# Patient Record
Sex: FEMALE | Race: WHITE | NOT HISPANIC OR LATINO | URBAN - METROPOLITAN AREA
[De-identification: names, ages, dates, MRNs, and addresses within clinical notes are randomized per-mention and may not be internally consistent; named-entity substitution may affect disease eponyms.]

---

## 2014-02-15 RX ORDER — PANTOPRAZOLE SODIUM 20 MG/1
1 TABLET, DELAYED RELEASE ORAL
Qty: 0 | Refills: 0 | DISCHARGE
Start: 2014-02-15

## 2017-07-23 ENCOUNTER — EMERGENCY (EMERGENCY)
Facility: HOSPITAL | Age: 72
LOS: 1 days | Discharge: HOME | End: 2017-07-23
Attending: EMERGENCY MEDICINE | Admitting: EMERGENCY MEDICINE
Payer: MEDICARE

## 2017-07-23 VITALS
DIASTOLIC BLOOD PRESSURE: 75 MMHG | HEART RATE: 87 BPM | OXYGEN SATURATION: 94 % | TEMPERATURE: 98 F | RESPIRATION RATE: 16 BRPM | SYSTOLIC BLOOD PRESSURE: 131 MMHG

## 2017-07-23 VITALS
TEMPERATURE: 98 F | DIASTOLIC BLOOD PRESSURE: 94 MMHG | HEART RATE: 83 BPM | OXYGEN SATURATION: 100 % | SYSTOLIC BLOOD PRESSURE: 153 MMHG | RESPIRATION RATE: 16 BRPM

## 2017-07-23 DIAGNOSIS — Z98.89 OTHER SPECIFIED POSTPROCEDURAL STATES: Chronic | ICD-10-CM

## 2017-07-23 DIAGNOSIS — Z96.652 PRESENCE OF LEFT ARTIFICIAL KNEE JOINT: Chronic | ICD-10-CM

## 2017-07-23 DIAGNOSIS — M25.519 PAIN IN UNSPECIFIED SHOULDER: Chronic | ICD-10-CM

## 2017-07-23 PROCEDURE — 73030 X-RAY EXAM OF SHOULDER: CPT | Mod: 26,RT

## 2017-07-23 PROCEDURE — 76377 3D RENDER W/INTRP POSTPROCES: CPT | Mod: 26

## 2017-07-23 PROCEDURE — 99284 EMERGENCY DEPT VISIT MOD MDM: CPT | Mod: 25

## 2017-07-23 PROCEDURE — 73200 CT UPPER EXTREMITY W/O DYE: CPT | Mod: 26,RT

## 2017-07-23 PROCEDURE — 76377 3D RENDER W/INTRP POSTPROCES: CPT

## 2017-07-23 PROCEDURE — 73060 X-RAY EXAM OF HUMERUS: CPT

## 2017-07-23 PROCEDURE — 73200 CT UPPER EXTREMITY W/O DYE: CPT

## 2017-07-23 PROCEDURE — 73030 X-RAY EXAM OF SHOULDER: CPT

## 2017-07-23 PROCEDURE — 73060 X-RAY EXAM OF HUMERUS: CPT | Mod: 26,RT

## 2017-07-23 PROCEDURE — 99284 EMERGENCY DEPT VISIT MOD MDM: CPT | Mod: GC

## 2017-07-23 NOTE — ED PROVIDER NOTE - MUSCULOSKELETAL, MLM
right superior, anterior shoulder pain on palpation, no deformity. superior scapular tenderness on palpation. pain on extending right arm foreword with intact strength.

## 2017-07-23 NOTE — ED PROVIDER NOTE - MEDICAL DECISION MAKING DETAILS
[age] yo female PMH right partial shoulder replacement s/p mechanical fall onto bed post last night; no head trauma; no loc; no anticoagulants; on exam right humeral head and lateral shoulder ttp; no sensory nor vascular deficits; Plan: xrays, pt took pain medicine at home, reassess

## 2017-07-23 NOTE — ED PROVIDER NOTE - PROGRESS NOTE DETAILS
Jorge PGY3: radiology concerned for new acromion fracture. CT recommended. ct completed; pt in sling; pt to f/u with ortho as outpatient.

## 2017-07-23 NOTE — ED ADULT NURSE NOTE - OBJECTIVE STATEMENT
72 year old female alert and oriented x 4 came to the ED accompanied by  c/o mechanical trip and fall last night landing on her right shoulder.  Patient has chronic shoulder pain for which she takes morphine 3-4 times per day and last dose was around noon today and she said it didn't really help pain, but it usually doesn't really relieve her chronic pain either.  Patient arrived in a sling and able to wiggle her fingers, radial pulse intact and no bruising noted.  Patient denies LOC or hitting her head.  No other signs of injury or deformity noted.  Patient reports this pain is similar to her chronic pain.  Safety ensured and patient assisted to the bathroom and back to the room by RN.

## 2017-07-23 NOTE — ED ADULT TRIAGE NOTE - CHIEF COMPLAINT QUOTE
lost balance, fell on right sided hit shoulder denies loc, head injury able to stand and ambulate afterwards   hx of shoulder replacement 4 years ago

## 2017-07-23 NOTE — ED PROVIDER NOTE - CARE PLAN
Principal Discharge DX:	Right shoulder pain, unspecified chronicity  Instructions for follow-up, activity and diet:	continue with your pain management regimen  Follow up with Dr. Blood this week  Continue to wear your sling for comfort.   You must return for new, worsening or concerning symptom; specifically including those listed on the attached sheet.   You may also follow up with any orthopedic doctor of your choice on the attached sheet.

## 2017-07-23 NOTE — ED ADULT NURSE NOTE - PSH
S/P Breast Biopsy    S/P BSO    S/P Bunionectomy    S/P knee replacement, left    S/P tonsillectomy and adenoidectomy    Shoulder pain  s/p Rt shoulder prosthetic surgery in past

## 2017-07-23 NOTE — ED PROVIDER NOTE - PLAN OF CARE
continue with your pain management regimen  Follow up with Dr. Blood this week  Continue to wear your sling for comfort.   You must return for new, worsening or concerning symptom; specifically including those listed on the attached sheet.   You may also follow up with any orthopedic doctor of your choice on the attached sheet.

## 2017-07-23 NOTE — ED PROVIDER NOTE - OBJECTIVE STATEMENT
72 year old, multiple medical problems, s/p right partial shoulder replacement ( [facility]). presenting with pain in right shoulder after falling last night with new worsening acute on chronic pain. endorses this is the same kind of pain as her normal shoulder pain but worse. patient fell last night, walking from bathroom to bed and mechanical fall when she missed the bed. pain is an 8/10. in sling from home. pain is worst on the top of shoulder and in the back. patient had scapular and clavicle fracture 2 years ago.     patient is on morphine to replace oxycodone for a few years prescribed by pain management. 72 year old, multiple medical problems, s/p right partial shoulder replacement ( [facility]). presenting with pain in right shoulder after falling last night with new worsening acute on chronic pain. endorses this is the same kind of pain as her normal shoulder pain but worse. patient fell last night, walking from bathroom to bed (usually uses her walker but didn't this time) and mechanical fall when she missed the bed. pain is an 8/10. in sling from home. pain is worst on the top of shoulder and in the back. patient had scapular and clavicle fracture 2 years ago.     patient is on morphine to replace oxycodone for a few years prescribed by pain management. 72 year old, multiple medical problems, s/p right partial shoulder replacement ( [facility]). presenting with pain in right shoulder after falling last night with new worsening acute on chronic pain. endorses this is the same kind of pain as her normal shoulder pain but worse. patient fell last night, walking from bathroom to bed (usually uses her walker but didn't this time) and mechanical fall when she missed the bed. pain is an 8/10. in sling from home. pain is worst on the top of shoulder and in the back. patient had scapular and clavicle fracture 2 years ago. denies hitting her head. remembers the events well.     patient is on morphine to replace oxycodone for a few years prescribed by pain management.

## 2017-07-23 NOTE — ED ADULT NURSE NOTE - PMH
C. difficile diarrhea  2013  DDD (Degenerative Disc Disease)    GERD (gastroesophageal reflux disease)    Herniated Disc    History of Osteoarthritis    History of Rotator Cuff Tear  left  History of Spinal Stenosis    HTN (Hypertension)    Hx MRSA Infection  3 yrs ago in right second toe tx with abx  Hyperlipidemia    MVP (Mitral Valve Prolapse)    Parkinsons  x 3yrs  PWS (Port Wine Stain)

## 2019-07-19 ENCOUNTER — EMERGENCY (EMERGENCY)
Facility: HOSPITAL | Age: 74
LOS: 1 days | Discharge: HOME | End: 2019-07-19
Attending: EMERGENCY MEDICINE
Payer: MEDICARE

## 2019-07-19 VITALS
WEIGHT: 134.92 LBS | SYSTOLIC BLOOD PRESSURE: 128 MMHG | RESPIRATION RATE: 18 BRPM | HEIGHT: 58 IN | DIASTOLIC BLOOD PRESSURE: 77 MMHG | OXYGEN SATURATION: 95 % | TEMPERATURE: 98 F | HEART RATE: 77 BPM

## 2019-07-19 DIAGNOSIS — Z98.89 OTHER SPECIFIED POSTPROCEDURAL STATES: Chronic | ICD-10-CM

## 2019-07-19 DIAGNOSIS — Z96.652 PRESENCE OF LEFT ARTIFICIAL KNEE JOINT: Chronic | ICD-10-CM

## 2019-07-19 DIAGNOSIS — M25.519 PAIN IN UNSPECIFIED SHOULDER: Chronic | ICD-10-CM

## 2019-07-19 PROCEDURE — 99283 EMERGENCY DEPT VISIT LOW MDM: CPT

## 2019-07-19 PROCEDURE — 99282 EMERGENCY DEPT VISIT SF MDM: CPT

## 2019-07-19 NOTE — ED PROVIDER NOTE - PROGRESS NOTE DETAILS
Attending MD Alcala: After rectal exam, patient felt urge to defecate and had a BM with hard formed brown stool.  Feels improved.  Declines enema now.  Would like to be discharged.  Stable for discharge.  Recommended Colorectal surgeon, would like to go to Dr HARRIET Moran as  knows him.  Follow up instructions given, importance of follow up emphasized, return to ED parameters reviewed and patient verbalized understanding.  All questions answered, all concerns addressed.

## 2019-07-19 NOTE — ED PROVIDER NOTE - CARE PROVIDER_API CALL
Antonio Moran)  ColonRectal Surgery; Surgery  [address], [address]  [place] [address]  Phone: [phone]  Fax: [phone]  Follow Up Time: 4-6 Days

## 2019-07-19 NOTE — ED PROVIDER NOTE - PHYSICAL EXAMINATION
PHYSICAL EXAM:  GENERAL: NAD, well-groomed, well-developed  HEAD:  Atraumatic, Normocephalic  EYES: EOMI, PERRLA, conjunctiva and sclera clear  ENMT: No tonsillar erythema, exudates, or enlargement; Moist mucous membranes  NECK: Supple, No JVD, Normal thyroid  HEART: Regular rate and rhythm; No murmurs, rubs, or gallops  RESPIRATORY: CTA B/L, No W/R/R  ABDOMEN: Soft, Nontender, Nondistended;   RECTAL: no fissures seen, mild blood seen on diaper and on brown stool ball, no hemorrhoids felt, no signs of prolapse, chaperone: Dr. Momin.   NEURO: A&Ox3, nonfocal, moving all extremities, normal strength, normal speech/memory, mild parkinsonian tremor   EXTREMITIES:  2+ Peripheral Pulses, No clubbing, cyanosis, or edema  SKIN: warm, dry, normal color, no rash or abnormal lesions

## 2019-07-19 NOTE — ED ADULT TRIAGE NOTE - CHIEF COMPLAINT QUOTE
pt c/o "constipation", states that she had BM today but "still feels like she has to go", has hx of constipation, last BM before today was 4 days ago, denies abdominal pain but c/o "discomfort"

## 2019-07-19 NOTE — ED ADULT NURSE REASSESSMENT NOTE - NS ED NURSE REASSESS COMMENT FT1
Patient had BM on bedpan. BM moderate amount, brown stool & formed. No blood in stool. Patient states that she feels relief and declines enema at this time.

## 2019-07-19 NOTE — ED PROVIDER NOTE - ATTENDING CONTRIBUTION TO CARE
Attending MD Alcala: I personally have seen and examined this patient.  Resident note reviewed and agree on plan of care and except where noted.  See below for details.     Seen in [facility], accompanied by     74F with PMH/PSH including umbilical hernia, L inguinal hernia, GI bleed, Parkinson's, degenerative disc and joint disease, osteoarthritis, cataract surgery OU , L Knee sx, R shoulder sx presents to the ED with constipation.  Reports had small BM this AM.  Denies bloody.  Reports last "normal" BM was about 3 days.  Denies dysuria, hematuria, change in urinary habits including frequency, urgency. Reports yesterday and 2 days ago pulled on what she thought was feces but was a hemorrhoid.  Reports has been told she has hemorrhoids in past (distant).  Reports did not seek medical care from colorectal surgeon.  Reports last colonoscopy was 5 yrs ago, reports normal.  Denies chest pain, shortness of breath.  Reports "maybe" palpitations, "can't remember when she had them".  Denies abdominal pain, nausea, vomiting.      Meds: morphine    rectal exam chapereoned by Dr Feng, stool in rectal vault, small amount of blood streak, stool brown, small balls Attending MD Alcala: I personally have seen and examined this patient.  Resident note reviewed and agree on plan of care and except where noted.  See below for details.     Seen in [facility], accompanied by     74F with PMH/PSH including umbilical hernia, L inguinal hernia, GI bleed, Parkinson's, degenerative disc and joint disease, osteoarthritis, cataract surgery OU , L Knee sx, R shoulder sx presents to the ED with constipation.  Reports had small BM this AM.  Denies bloody.  Reports last "normal" BM was about 3 days.  Denies dysuria, hematuria, change in urinary habits including frequency, urgency. Reports yesterday and 2 days ago pulled on what she thought was feces but was a hemorrhoid.  Reports has been told she has hemorrhoids in past (distant).  Reports did not seek medical care from colorectal surgeon.  Reports last colonoscopy was 5 yrs ago, reports normal.  Denies chest pain, shortness of breath.  Reports "maybe" palpitations, "can't remember when she had them".  Denies abdominal pain, nausea, vomiting.  Denies fevers, chills.  ON exam, NAD, head NCAT, PERRL, FROM at neck, no tenderness to midline palpation, no stepoffs along length of spine, lungs CTAB with good inspiratory effort, +S1S2, no m/r/g, abdomen soft with +BS, NT, ND, no CVAT, rectal exam chaperoned by Dr Feng, stool in rectal vault, small amount of blood streak, stool brown, small balls, moving all extremities with 5/5 strength bilateral upper and lower extremities, good and equal  strength bilaterally; A/P: 74F with constipation, requesting enema.  Patient showed picture from this morning,  appears to have a prolapsed rectum, must have self reduced.  Explained that she needs colorectal follow up.  Stable for discharge. Follow up instructions given, importance of follow up emphasized, return to ED parameters reviewed and patient verbalized understanding.  All questions answered, all concerns addressed.

## 2019-07-19 NOTE — ED PROVIDER NOTE - NSFOLLOWUPINSTRUCTIONS_ED_ALL_ED_FT
Try and avoid constipation by keeping well hydrated, increasing your fiber intake, avoiding dairy, and taking your stool softening medications.    Follow up with your doctor.    Return to ER for new or concerning symptoms.

## 2019-07-20 ENCOUNTER — EMERGENCY (EMERGENCY)
Facility: HOSPITAL | Age: 74
LOS: 1 days | Discharge: HOME | End: 2019-07-20
Attending: EMERGENCY MEDICINE
Payer: MEDICARE

## 2019-07-20 VITALS
DIASTOLIC BLOOD PRESSURE: 42 MMHG | HEART RATE: 96 BPM | WEIGHT: 134.92 LBS | RESPIRATION RATE: 18 BRPM | SYSTOLIC BLOOD PRESSURE: 103 MMHG | TEMPERATURE: 98 F | HEIGHT: 58 IN | OXYGEN SATURATION: 100 %

## 2019-07-20 VITALS
SYSTOLIC BLOOD PRESSURE: 116 MMHG | RESPIRATION RATE: 16 BRPM | HEART RATE: 92 BPM | DIASTOLIC BLOOD PRESSURE: 73 MMHG | OXYGEN SATURATION: 98 %

## 2019-07-20 VITALS
TEMPERATURE: 99 F | DIASTOLIC BLOOD PRESSURE: 47 MMHG | SYSTOLIC BLOOD PRESSURE: 101 MMHG | OXYGEN SATURATION: 96 % | HEART RATE: 79 BPM | RESPIRATION RATE: 18 BRPM

## 2019-07-20 DIAGNOSIS — Z98.89 OTHER SPECIFIED POSTPROCEDURAL STATES: Chronic | ICD-10-CM

## 2019-07-20 DIAGNOSIS — M25.519 PAIN IN UNSPECIFIED SHOULDER: Chronic | ICD-10-CM

## 2019-07-20 DIAGNOSIS — Z96.652 PRESENCE OF LEFT ARTIFICIAL KNEE JOINT: Chronic | ICD-10-CM

## 2019-07-20 PROCEDURE — 99283 EMERGENCY DEPT VISIT LOW MDM: CPT

## 2019-07-20 NOTE — ED PROVIDER NOTE - OBJECTIVE STATEMENT
74 yoF, PMHx of parkinson's, chronic pain, DJD, spinal stenosis, severe arthritis, presents to ED with history of constipation for several weeks. Is on significant morphine chronically for pain. Seen last night in ED by this physician. 74 yoF, PMHx of parkinson's, chronic pain, DJD, spinal stenosis, severe arthritis, presents to ED with history of constipation for several weeks. Is on significant morphine chronically for pain. Seen last night in ED by this physician. At that time had rectal exam and prolapse (by history and photo) but exam just showed soft brown stool. After removal of stool ball offered enema but pt actually had good movement. Then declined enema. Had another bowel movement in interval history since discharge but it still concerned by feeling like she has to defecate but is unable to. No fever, abd pain, or NVD.

## 2019-07-20 NOTE — ED ADULT NURSE NOTE - OBJECTIVE STATEMENT
75 y/o female A&Ox3 with hx of Parkinson and arthritis presents to ED for constipation. Pt reports being in ED last night with same issue, had 2 BM while at [facility] but did not want the enema that was ordered. Pt takes morphine at home for arthritic pain and takes colace 3x a day. As per pt, had BM this morning but still feels uncomfortable and that she needs to "have another BM". Abdomen soft, non tender and non distended. Denies dysuria or blood in urine or stool. No fevers, chills, n/v/d. No SOB, CP, weakness, numbness or tingling. Safety measures maintained with  at bedside.

## 2019-07-20 NOTE — ED PROVIDER NOTE - PROGRESS NOTE DETAILS
Ping PGY2- rectocele on examination (myranda Smith RN), reduced spontaneously, stable for d/c, Dr. Moran contact info given

## 2019-07-20 NOTE — ED PROVIDER NOTE - PHYSICAL EXAMINATION
PHYSICAL EXAM:  GENERAL: NAD, well-groomed, well-developed  HEAD:  Atraumatic, Normocephalic  EYES: EOMI, PERRLA, conjunctiva and sclera clear  ENMT: No tonsillar erythema, exudates, or enlargement; Moist mucous membranes  NECK: Supple, No JVD  HEART: Regular rate and rhythm; No murmurs, rubs, or gallops  RESPIRATORY: CTA B/L, No W/R/R  ABDOMEN: Soft, Nontender, Nondistended  NEURO: A&Ox3, nonfocal, moving all extremities  EXTREMITIES:  2+ Peripheral Pulses, No clubbing, cyanosis, or edema  SKIN: warm, dry, normal color, no rash or abnormal lesions

## 2019-07-20 NOTE — ED PROVIDER NOTE - CARE PROVIDER_API CALL
Antonio Moran)  ColonRectal Surgery; Surgery  [address], [address]  [place] [address]  Phone: [phone]  Fax: [phone]  Follow Up Time:

## 2019-07-20 NOTE — ED ADULT NURSE NOTE - CHPI ED NUR SYMPTOMS NEG
no dysuria/no diarrhea/no hematuria/no fever/no abdominal distension/no blood in stool/no burning urination/no nausea/no vomiting/no chills

## 2019-07-20 NOTE — ED ADULT NURSE NOTE - OBJECTIVE STATEMENT
75 y/o female history of GERD, HLD, Parkison's, mitral valve prolapse, HTN, degenerative disc disease presents to the ED  from home c/o constipation. Patient states that she has had constipation for the past several weeks. Takes morphine for her back pain. Patient had a BM today but states that she feels like she still has to go. Denies blood in stool. Denies fever, chills, n/v, weakness, abd pain, diarrhea numbness/tingling, urinary s/s. Patient A&Ox3, in no respiratory distress, and denies chest pain. Abdomen soft and non distended. No tenderness upon palpation.

## 2019-07-20 NOTE — ED ADULT NURSE NOTE - NSIMPLEMENTINTERV_GEN_ALL_ED
Implemented All Universal Safety Interventions:  [place] to call system. Call bell, personal items and telephone within reach. Instruct patient to call for assistance. Room bathroom lighting operational. Non-slip footwear when patient is off stretcher. Physically safe environment: no spills, clutter or unnecessary equipment. Stretcher in lowest position, wheels locked, appropriate side rails in place.

## 2019-07-20 NOTE — ED PROVIDER NOTE - CLINICAL SUMMARY MEDICAL DECISION MAKING FREE TEXT BOX
Haverty PGY2- 2nd visit for in 24 hours for constiaption, will repeat rectal exam and try enema  clincally looks well, VSS, no distress Haverty PGY2- 2nd visit for in 24 hours for constiaption, will repeat rectal exam and try enema  clincally looks well, VSS, no distress    ROCCO Cespedes MD: 74 yoF, PMHx of parkinson's, chronic pain, DJD, spinal stenosis, severe arthritis, presents to ED with history of constipation for several weeks. Is on significant morphine chronically for pain. Seen last night in ED for constipation, at that time had rectal exam and prolapse (by history and photo). After removal of stool ball offered enema but pt actually had large bowel movement. Then declined enema. Had another bowel movement in interval history since discharge but it still concerned by feeling like she has to defecate but is unable to. No fever, abd pain, or NVD. DDx: rectocele, constipation, fecal impaction. Plan: rectal exam +/- enema (if pt is in agreement), bowel regimen and outpt GI/colorectal f/u  Abdominal exam benign, no abdominal pain

## 2019-07-21 PROCEDURE — 99283 EMERGENCY DEPT VISIT LOW MDM: CPT

## 2019-07-21 RX ORDER — MULTIVIT WITH MIN/MFOLATE/K2 340-15/3 G
1 POWDER (GRAM) ORAL ONCE
Refills: 0 | Status: COMPLETED | OUTPATIENT
Start: 2019-07-21 | End: 2019-07-21

## 2019-07-21 RX ADMIN — Medication 1 BOTTLE: at 00:23

## 2019-07-21 RX ADMIN — Medication 1 ENEMA: at 00:23

## 2019-07-23 ENCOUNTER — FORM ENCOUNTER (OUTPATIENT)
Age: 74
End: 2019-07-23

## 2019-07-23 ENCOUNTER — APPOINTMENT (OUTPATIENT)
Dept: COLORECTAL SURGERY | Facility: CLINIC | Age: 74
End: 2019-07-23
Payer: MEDICARE

## 2019-07-23 VITALS
HEART RATE: 71 BPM | SYSTOLIC BLOOD PRESSURE: 101 MMHG | BODY MASS INDEX: 28.34 KG/M2 | TEMPERATURE: 97.9 F | WEIGHT: 135 LBS | DIASTOLIC BLOOD PRESSURE: 65 MMHG | OXYGEN SATURATION: 94 % | HEIGHT: 58 IN

## 2019-07-23 DIAGNOSIS — Z86.69 PERSONAL HISTORY OF OTHER DISEASES OF THE NERVOUS SYSTEM AND SENSE ORGANS: ICD-10-CM

## 2019-07-23 DIAGNOSIS — Z78.9 OTHER SPECIFIED HEALTH STATUS: ICD-10-CM

## 2019-07-23 DIAGNOSIS — K59.03 DRUG INDUCED CONSTIPATION: ICD-10-CM

## 2019-07-23 DIAGNOSIS — Z80.9 FAMILY HISTORY OF MALIGNANT NEOPLASM, UNSPECIFIED: ICD-10-CM

## 2019-07-23 DIAGNOSIS — M25.9 JOINT DISORDER, UNSPECIFIED: ICD-10-CM

## 2019-07-23 DIAGNOSIS — N81.6 RECTOCELE: ICD-10-CM

## 2019-07-23 DIAGNOSIS — T40.2X5A DRUG INDUCED CONSTIPATION: ICD-10-CM

## 2019-07-23 PROCEDURE — 46600 DIAGNOSTIC ANOSCOPY SPX: CPT

## 2019-07-23 PROCEDURE — 99204 OFFICE O/P NEW MOD 45 MIN: CPT | Mod: 25

## 2019-07-23 RX ORDER — DULOXETINE HYDROCHLORIDE 60 MG/1
60 CAPSULE, DELAYED RELEASE ORAL
Refills: 0 | Status: ACTIVE | COMMUNITY

## 2019-07-23 RX ORDER — BIOTIN 10 MG
TABLET ORAL
Refills: 0 | Status: ACTIVE | COMMUNITY

## 2019-07-23 RX ORDER — CARVEDILOL 3.12 MG/1
TABLET, FILM COATED ORAL
Refills: 0 | Status: ACTIVE | COMMUNITY

## 2019-07-23 RX ORDER — CHOLECALCIFEROL (VITAMIN D3) 25 MCG
TABLET ORAL
Refills: 0 | Status: ACTIVE | COMMUNITY

## 2019-07-23 RX ORDER — ZOLPIDEM TARTRATE 10 MG/1
10 TABLET, FILM COATED ORAL
Refills: 0 | Status: ACTIVE | COMMUNITY

## 2019-07-23 RX ORDER — MORPHINE SULFATE 30 MG/1
TABLET ORAL
Refills: 0 | Status: ACTIVE | COMMUNITY

## 2019-07-23 RX ORDER — ROSUVASTATIN CALCIUM 5 MG/1
TABLET, FILM COATED ORAL
Refills: 0 | Status: ACTIVE | COMMUNITY

## 2019-07-23 RX ORDER — VITAMIN E ACID SUCCINATE 268 MG
TABLET ORAL
Refills: 0 | Status: ACTIVE | COMMUNITY

## 2019-07-23 NOTE — PHYSICAL EXAM
[Excoriation] : no perianal excoriation [Fistula] : no fistulas [Wart] : no warts [Lax] : was lax [Ulcer ___ cm] : no ulcers [None] : there was no rectal mass  [Normal Breath Sounds] : Normal breath sounds [Wheezing] : no wheezing was heard [Normal Heart Sounds] : normal heart sounds [Normal Rate and Rhythm] : normal rate and rhythm [Alert] : alert [Oriented to Person] : oriented to person [Oriented to Place] : oriented to place [Calm] : calm [Oriented to Time] : oriented to time [de-identified] : soft, NT/ND [de-identified] : very difficult exam as patient is not able to lay on the bed on her side comfortably, + full thickness prolapse with valsalva [de-identified] : anoscopy reveals no proctitis [de-identified] : NAD, sitting in wheelchair with resting tremor [de-identified] : NCAT [de-identified] : supple [de-identified] : warm

## 2019-07-23 NOTE — HISTORY OF PRESENT ILLNESS
[FreeTextEntry1] : The patient presents with a longstanding h/o constipation. She is on chronic morphine for back, shoulder and foot pain. She reports BMs about twice a week that are hard and require straining. She was in the ER  2 nights ago with the acute onset of rectal prolapse that self reduced by the time she got to the ER. She returned to the ER the following night with recurrent prolapse and was given an enema with reduction of the prolapse. SHe also reports difficulty urinating and feeling something prolapsing though her vagina as well.\par She denies fecal incontinence\par She has a h/o parkinson's with the above areas of pain and is mostly wheelchair bound. She is able to walk very short distances with a walker. \par Her last colonoscopy was in 2014 by Dr. Willoughby

## 2019-07-23 NOTE — ASSESSMENT
[FreeTextEntry1] : Rectal prolapse in the setting of opioid induced constipation\par \par The patient has tried miralax in the past but states that it gave her diarrhea. She will try a daily fiber supplement to start and will increase her fluid intake as she states that she doesn't hydrate well at all\par \par Will do CT to eval colon anatomy\par She was offered either robotic/laparoscopic rectopexy +/- sigmoid resection depending on redundancy seen on CT, vs [place] perineal rectosigmoidectomy.\par ONce the CT is done will discuss with her the best, most appropriate option\par \par Will refer to urogyn Dr. Arreola for eval of difficulty urinating and possible uterine/bladder prolapse.

## 2019-07-23 NOTE — CONSULT LETTER
[Dear  ___] : Dear  [unfilled], [Consult Letter:] : I had the pleasure of evaluating your patient, [unfilled]. [Please see my note below.] : Please see my note below. [Consult Closing:] : Thank you very much for allowing me to participate in the care of this patient.  If you have any questions, please do not hesitate to contact me. [Sincerely,] : Sincerely, [FreeTextEntry3] : Rishabh Brink MD, FACS, FASCRS\par Colorectal Surgery\par [facility]\par Assistant Professor of Surgery [employer] [facility]\par [address] [place] [address], [address]\[place] [address]\par Tel: [phone] \par Cell: [phone] \par Email: [email or web address]\par

## 2019-07-24 ENCOUNTER — APPOINTMENT (OUTPATIENT)
Dept: CT IMAGING | Facility: CLINIC | Age: 74
End: 2019-07-24
Payer: MEDICARE

## 2019-07-24 ENCOUNTER — OUTPATIENT (OUTPATIENT)
Dept: OUTPATIENT SERVICES | Facility: HOSPITAL | Age: 74
LOS: 1 days | End: 2019-07-24
Payer: MEDICARE

## 2019-07-24 DIAGNOSIS — Z96.652 PRESENCE OF LEFT ARTIFICIAL KNEE JOINT: Chronic | ICD-10-CM

## 2019-07-24 DIAGNOSIS — M25.519 PAIN IN UNSPECIFIED SHOULDER: Chronic | ICD-10-CM

## 2019-07-24 DIAGNOSIS — Z98.89 OTHER SPECIFIED POSTPROCEDURAL STATES: Chronic | ICD-10-CM

## 2019-07-24 DIAGNOSIS — Z00.8 ENCOUNTER FOR OTHER GENERAL EXAMINATION: ICD-10-CM

## 2019-07-24 PROCEDURE — 74177 CT ABD & PELVIS W/CONTRAST: CPT

## 2019-07-24 PROCEDURE — 74176 CT ABD & PELVIS W/O CONTRAST: CPT

## 2019-07-24 PROCEDURE — 74176 CT ABD & PELVIS W/O CONTRAST: CPT | Mod: 26

## 2019-08-01 ENCOUNTER — APPOINTMENT (OUTPATIENT)
Dept: UROGYNECOLOGY | Facility: CLINIC | Age: 74
End: 2019-08-01
Payer: MEDICARE

## 2019-08-01 VITALS
WEIGHT: 135 LBS | BODY MASS INDEX: 28.34 KG/M2 | DIASTOLIC BLOOD PRESSURE: 70 MMHG | HEIGHT: 58 IN | SYSTOLIC BLOOD PRESSURE: 110 MMHG

## 2019-08-01 VITALS
SYSTOLIC BLOOD PRESSURE: 110 MMHG | BODY MASS INDEX: 28.34 KG/M2 | HEIGHT: 58 IN | DIASTOLIC BLOOD PRESSURE: 70 MMHG | WEIGHT: 135 LBS

## 2019-08-01 DIAGNOSIS — N81.11 CYSTOCELE, MIDLINE: ICD-10-CM

## 2019-08-01 DIAGNOSIS — N81.2 INCOMPLETE UTEROVAGINAL PROLAPSE: ICD-10-CM

## 2019-08-01 DIAGNOSIS — R33.9 RETENTION OF URINE, UNSPECIFIED: ICD-10-CM

## 2019-08-01 DIAGNOSIS — N39.0 URINARY TRACT INFECTION, SITE NOT SPECIFIED: ICD-10-CM

## 2019-08-01 DIAGNOSIS — R39.15 URGENCY OF URINATION: ICD-10-CM

## 2019-08-01 LAB
BILIRUB UR QL STRIP: NORMAL
CLARITY UR: NORMAL
COLLECTION METHOD: NORMAL
GLUCOSE UR-MCNC: NORMAL
HCG UR QL: 0.2 EU/DL
HGB UR QL STRIP.AUTO: NORMAL
KETONES UR-MCNC: NORMAL
LEUKOCYTE ESTERASE UR QL STRIP: NORMAL
NITRITE UR QL STRIP: POSITIVE
PH UR STRIP: 7
PROT UR STRIP-MCNC: NORMAL
SP GR UR STRIP: 1.02

## 2019-08-01 PROCEDURE — 51701 INSERT BLADDER CATHETER: CPT

## 2019-08-01 PROCEDURE — 99204 OFFICE O/P NEW MOD 45 MIN: CPT | Mod: 25

## 2019-08-01 NOTE — PHYSICAL EXAM
[No Acute Distress] : in no acute distress [Well developed] : well developed [Oriented x3] : oriented to person, place, and time [Well Nourished] : ~L well nourished [Warm and Dry] : was warm and dry to touch [Labia Majora] : were normal [Normal Appearance] : general appearance was normal [Labia Minora] : were normal [Atrophy] : atrophy [Normal] : was normal [No Bleeding] : there was no active vaginal bleeding [Aa ____] : Aa [unfilled] [Ba ____] : Ba [unfilled] [C ____] : C [unfilled] [GH ____] : GH [unfilled] [PB ____] : PB [unfilled] [TVL ____] : TVL  [unfilled] [Ap ____] : Ap [unfilled] [Bp ____] : Bp [unfilled] [D ____] : D [unfilled] [Anxiety] : patient is not anxious [Distended] : not distended [Tenderness] : ~T no ~M abdominal tenderness observed [H/Smegaly] : no hepatosplenomegaly [Inguinal LAD] : no adenopathy was noted in the inguinal lymph nodes [Normal Gait] : gait was abnormal [de-identified] : h/o full thickness rectal prolapse, now reduced

## 2019-08-01 NOTE — DISCUSSION/SUMMARY
[FreeTextEntry1] : Taisha presents with symptoms of incomplete bladder emptying. Her PVR was measured today and found to be normal, 20 cc. She has stage 2 uterovaginal prolapse and midline cystocele. Findings reviewed. \par 1. Feelings of incomplete bladder emptying, urgency\par -Patient counseled of her normal PVR today\par -We discussed possible etiologies of her symptoms including severe constipation, bladder outlet obstruction, neurogenic bladder. I recommend further evaluation of her urinary symptoms with urodynamic testing. IUGA information on urodynamic testing provided to her to review\par \par 2. Uterovaginal prolapse:\par We reviewed management options for her prolapse including surgical management at the time of her rectal prolapse repair. We reviewed various surgical management options discussing both the abdominal and vaginal approaches to surgery. We also discussed procedures involving hysterectomy. She reports she is not bothered by her vaginal prolapse and would like to proceed with colorectal surgery only. If she develops bothersome vaginal prolapse symptoms in the future, she will proceed with intervention at that time. I counseled her that if she chooses to undergo surgery, I will need preop workup including both urodynamic testing and pelvic sonogram.   Written IUGA information on prolapse treatment options was also provided to her to review. \par

## 2019-08-01 NOTE — PROCEDURE
[FreeTextEntry1] : Sterile straight catheterization was performed to measure a postvoid residual volume which was 20 cc\par

## 2019-08-01 NOTE — HISTORY OF PRESENT ILLNESS
[Constipation Obstructed Defecation] : daily [Rectal Prolapse] : none [Vaginal Wall Prolapse] : none [Unable To Restrain Bowel Movement] : rare [Urinary Tract Infection] : none [Pain During Urination (Dysuria)] : daily [] : years ago [de-identified] : voids 4 times a day [de-identified] : 0-1 time, wakes up with nocturnal enuresis [FreeTextEntry1] : \par Taisha presents for consultation. She has a h/o full thickness rectal prolapse and is undergoing workup for surgical repair with Dr. Brink. At her visit, she reported symptoms of incomplete bladder emptying and occasional vaginal pressure/bulge concerning for vaginal prolapse. She is mostly wheelchair bound however can walk short distances with walker. \par \par PMH: Parkinson's  disease, HTN, knee and shoulder replacement, chronic back pain\par PSH: laparoscopic oophorectomy, shoulder and knee surgery\par Social history: nonsmoker

## 2019-08-02 ENCOUNTER — APPOINTMENT (OUTPATIENT)
Dept: COLORECTAL SURGERY | Facility: CLINIC | Age: 74
End: 2019-08-02
Payer: MEDICARE

## 2019-08-02 DIAGNOSIS — K62.3 RECTAL PROLAPSE: ICD-10-CM

## 2019-08-02 PROCEDURE — 99213 OFFICE O/P EST LOW 20 MIN: CPT

## 2019-08-02 RX ORDER — NALOXEGOL OXALATE 12.5 MG/1
12.5 TABLET, FILM COATED ORAL DAILY
Qty: 30 | Refills: 4 | Status: ACTIVE | COMMUNITY
Start: 2019-08-02 | End: 1900-01-01

## 2019-08-02 NOTE — HISTORY OF PRESENT ILLNESS
[FreeTextEntry1] : The patient was seen by Dr. Cook and ultimately decided to hold of on any urogyn surgery at this point. She is here to discuss surgical options for her rectal prolapse

## 2019-08-02 NOTE — PHYSICAL EXAM
[None] : no anal fissures seen [Normal Breath Sounds] : Normal breath sounds [Wheezing] : no wheezing was heard [Normal Heart Sounds] : normal heart sounds [Normal Rate and Rhythm] : normal rate and rhythm [Alert] : alert [Oriented to Place] : oriented to place [Oriented to Person] : oriented to person [Oriented to Time] : oriented to time [Calm] : calm [de-identified] : soft, NT/ND [de-identified] : NAD, sitting in wheelchair with resting tremor [de-identified] : NCAT [de-identified] : warm [de-identified] : supple

## 2019-08-02 NOTE — ASSESSMENT
[FreeTextEntry1] : The patient was offered robotic, possible open rectopexy. The R/B/A were d/w her including but not limited to pain, bleeding, infection, ileus, wound complications, MI, stroke, DVT, PE, and death. \par I spoke with her GI Dr. Lyn Murdock who agrees to start her on Movantik to try to counteract the opioids. \par She will be scheduled in the coming weeks. \par

## 2019-08-02 NOTE — CONSULT LETTER
[Dear  ___] : Dear  [unfilled], [Courtesy Letter:] : I had the pleasure of seeing your patient, [unfilled], in my office today. [Please see my note below.] : Please see my note below. [Sincerely,] : Sincerely, [FreeTextEntry3] : Rishabh Brink MD, FACS, FASCRS\par Colorectal Surgery\par [facility]\par Assistant Professor of Surgery [employer] at [facility]\par [address] [place] [address], [address]\[place] [address]\par Tel: [phone] \par Cell: [phone] \par Email: [email or web address]\par

## 2019-08-05 LAB
APPEARANCE: CLEAR
BACTERIA UR CULT: ABNORMAL
BACTERIA: ABNORMAL
BILIRUBIN URINE: NEGATIVE
BLOOD URINE: NEGATIVE
COLOR: YELLOW
GLUCOSE QUALITATIVE U: NEGATIVE
HYALINE CASTS: 0 /LPF
KETONES URINE: NEGATIVE
LEUKOCYTE ESTERASE URINE: ABNORMAL
MICROSCOPIC-UA: NORMAL
NITRITE URINE: POSITIVE
PH URINE: 7
PROTEIN URINE: ABNORMAL
RED BLOOD CELLS URINE: 1 /HPF
SPECIFIC GRAVITY URINE: 1.02
SQUAMOUS EPITHELIAL CELLS: 0 /HPF
UROBILINOGEN URINE: NORMAL
WHITE BLOOD CELLS URINE: 37 /HPF

## 2019-08-05 RX ORDER — NITROFURANTOIN (MONOHYDRATE/MACROCRYSTALS) 25; 75 MG/1; MG/1
100 CAPSULE ORAL
Qty: 10 | Refills: 0 | Status: ACTIVE | COMMUNITY
Start: 2019-08-05 | End: 1900-01-01

## 2019-08-06 ENCOUNTER — APPOINTMENT (OUTPATIENT)
Dept: UROGYNECOLOGY | Facility: CLINIC | Age: 74
End: 2019-08-06

## 2019-08-15 RX ORDER — METHYLNALTREXONE BROMIDE 150 MG/1
150 TABLET ORAL EVERY MORNING
Qty: 90 | Refills: 6 | Status: ACTIVE | COMMUNITY
Start: 2019-08-15 | End: 1900-01-01

## 2019-08-19 ENCOUNTER — OUTPATIENT (OUTPATIENT)
Dept: OUTPATIENT SERVICES | Facility: HOSPITAL | Age: 74
LOS: 1 days | End: 2019-08-19
Payer: MEDICARE

## 2019-08-19 VITALS
WEIGHT: 143.08 LBS | OXYGEN SATURATION: 96 % | HEART RATE: 74 BPM | SYSTOLIC BLOOD PRESSURE: 102 MMHG | RESPIRATION RATE: 15 BRPM | DIASTOLIC BLOOD PRESSURE: 62 MMHG | HEIGHT: 56 IN | TEMPERATURE: 97 F

## 2019-08-19 DIAGNOSIS — Z98.890 OTHER SPECIFIED POSTPROCEDURAL STATES: Chronic | ICD-10-CM

## 2019-08-19 DIAGNOSIS — Z98.89 OTHER SPECIFIED POSTPROCEDURAL STATES: Chronic | ICD-10-CM

## 2019-08-19 DIAGNOSIS — K62.3 RECTAL PROLAPSE: ICD-10-CM

## 2019-08-19 DIAGNOSIS — Z01.818 ENCOUNTER FOR OTHER PREPROCEDURAL EXAMINATION: ICD-10-CM

## 2019-08-19 DIAGNOSIS — I10 ESSENTIAL (PRIMARY) HYPERTENSION: ICD-10-CM

## 2019-08-19 DIAGNOSIS — Z96.652 PRESENCE OF LEFT ARTIFICIAL KNEE JOINT: Chronic | ICD-10-CM

## 2019-08-19 DIAGNOSIS — Z98.49 CATARACT EXTRACTION STATUS, UNSPECIFIED EYE: Chronic | ICD-10-CM

## 2019-08-19 DIAGNOSIS — Z71.89 OTHER SPECIFIED COUNSELING: ICD-10-CM

## 2019-08-19 DIAGNOSIS — Z29.9 ENCOUNTER FOR PROPHYLACTIC MEASURES, UNSPECIFIED: ICD-10-CM

## 2019-08-19 DIAGNOSIS — G20 PARKINSON'S DISEASE: ICD-10-CM

## 2019-08-19 DIAGNOSIS — M25.519 PAIN IN UNSPECIFIED SHOULDER: Chronic | ICD-10-CM

## 2019-08-19 LAB
ANION GAP SERPL CALC-SCNC: 10 MMOL/L — SIGNIFICANT CHANGE UP (ref 5–17)
BLD GP AB SCN SERPL QL: NEGATIVE — SIGNIFICANT CHANGE UP
BUN SERPL-MCNC: 20 MG/DL — SIGNIFICANT CHANGE UP (ref 7–23)
CALCIUM SERPL-MCNC: 9.7 MG/DL — SIGNIFICANT CHANGE UP (ref 8.4–10.5)
CHLORIDE SERPL-SCNC: 102 MMOL/L — SIGNIFICANT CHANGE UP (ref 96–108)
CO2 SERPL-SCNC: 25 MMOL/L — SIGNIFICANT CHANGE UP (ref 22–31)
CREAT SERPL-MCNC: 0.71 MG/DL — SIGNIFICANT CHANGE UP (ref 0.5–1.3)
GLUCOSE SERPL-MCNC: 100 MG/DL — HIGH (ref 70–99)
HBA1C BLD-MCNC: 5.7 % — HIGH (ref 4–5.6)
HCT VFR BLD CALC: 36.8 % — SIGNIFICANT CHANGE UP (ref 34.5–45)
HGB BLD-MCNC: 12.2 G/DL — SIGNIFICANT CHANGE UP (ref 11.5–15.5)
MCHC RBC-ENTMCNC: 29.5 PG — SIGNIFICANT CHANGE UP (ref 27–34)
MCHC RBC-ENTMCNC: 33.2 GM/DL — SIGNIFICANT CHANGE UP (ref 32–36)
MCV RBC AUTO: 89.1 FL — SIGNIFICANT CHANGE UP (ref 80–100)
PLATELET # BLD AUTO: 197 K/UL — SIGNIFICANT CHANGE UP (ref 150–400)
POTASSIUM SERPL-MCNC: 4.3 MMOL/L — SIGNIFICANT CHANGE UP (ref 3.5–5.3)
POTASSIUM SERPL-SCNC: 4.3 MMOL/L — SIGNIFICANT CHANGE UP (ref 3.5–5.3)
RBC # BLD: 4.13 M/UL — SIGNIFICANT CHANGE UP (ref 3.8–5.2)
RBC # FLD: 14.3 % — SIGNIFICANT CHANGE UP (ref 10.3–14.5)
RH IG SCN BLD-IMP: POSITIVE — SIGNIFICANT CHANGE UP
SODIUM SERPL-SCNC: 137 MMOL/L — SIGNIFICANT CHANGE UP (ref 135–145)
WBC # BLD: 7.42 K/UL — SIGNIFICANT CHANGE UP (ref 3.8–10.5)
WBC # FLD AUTO: 7.42 K/UL — SIGNIFICANT CHANGE UP (ref 3.8–10.5)

## 2019-08-19 PROCEDURE — 86850 RBC ANTIBODY SCREEN: CPT

## 2019-08-19 PROCEDURE — 85027 COMPLETE CBC AUTOMATED: CPT

## 2019-08-19 PROCEDURE — G0463: CPT

## 2019-08-19 PROCEDURE — 86900 BLOOD TYPING SEROLOGIC ABO: CPT

## 2019-08-19 PROCEDURE — 83036 HEMOGLOBIN GLYCOSYLATED A1C: CPT

## 2019-08-19 PROCEDURE — 80048 BASIC METABOLIC PNL TOTAL CA: CPT

## 2019-08-19 PROCEDURE — 86901 BLOOD TYPING SEROLOGIC RH(D): CPT

## 2019-08-19 RX ORDER — DULOXETINE HYDROCHLORIDE 30 MG/1
1 CAPSULE, DELAYED RELEASE ORAL
Qty: 0 | Refills: 0 | DISCHARGE

## 2019-08-19 RX ORDER — ROSUVASTATIN CALCIUM 5 MG/1
1 TABLET ORAL
Qty: 0 | Refills: 0 | DISCHARGE

## 2019-08-19 RX ORDER — UBIDECARENONE 100 MG
1 CAPSULE ORAL
Qty: 0 | Refills: 0 | DISCHARGE

## 2019-08-19 RX ORDER — ALUMINUM ZIRCONIUM TRICHLOROHYDREX GLY 0.2 G/G
1 STICK TOPICAL
Qty: 0 | Refills: 0 | DISCHARGE

## 2019-08-19 RX ORDER — AMILORIDE HCL 5 MG
1 TABLET ORAL
Qty: 0 | Refills: 0 | DISCHARGE

## 2019-08-19 NOTE — H&P PST ADULT - NSANTHOSAYNRD_GEN_A_CORE
No. CARLOS ENRIQUE screening performed.  STOP BANG Legend: 0-2 = LOW Risk; 3-4 = INTERMEDIATE Risk; 5-8 = HIGH Risk

## 2019-08-19 NOTE — H&P PST ADULT - PRO PAIN LIFE ADAPT
decreased activity level/inability or reluctance to perform ADLs/inability to concentrate/withdrawal from social contact decreased activity level

## 2019-08-19 NOTE — H&P PST ADULT - NSICDXPASTMEDICALHX_GEN_ALL_CORE_FT
PAST MEDICAL HISTORY:  C. difficile diarrhea 2013    DDD (Degenerative Disc Disease)     GERD (gastroesophageal reflux disease)     Herniated Disc     History of Osteoarthritis     History of Rotator Cuff Tear left    History of Spinal Stenosis     HTN (Hypertension)     Hx MRSA Infection 3 yrs ago in right second toe tx with abx    Hyperlipidemia     MVP (Mitral Valve Prolapse) benign    Parkinsons x 10 yrs    PWS (Port Wine Stain) PAST MEDICAL HISTORY:  C. difficile diarrhea 2013    Chronic constipation     DDD (Degenerative Disc Disease) chronic pain    GERD (gastroesophageal reflux disease)     History of Osteoarthritis     History of Rotator Cuff Tear right    History of Spinal Stenosis     HTN (Hypertension)     Hx MRSA Infection 3 yrs ago in right second toe tx with abx    Hyperlipidemia     Insomnia     MVP (Mitral Valve Prolapse) benign    Parkinsons x 10 yrs    PWS (Port Wine Stain)     Rectal prolapse     Vitamin D deficiency

## 2019-08-19 NOTE — H&P PST ADULT - HISTORY OF PRESENT ILLNESS
65 yo white female with history of parkinsons disease, spinal stenosis and degerative disc disease, c/o left knee pain, swelling and decreased mobility with knee occasionally giving out while standing. 67 yo white female with history of parkinsons disease, spinal stenosis and degerative disc disease, c/o left knee pain, swelling and decreased mobility with knee occasionally giving out while standing.    ***pt reports being admitted 1 day prior to surgery for colon prep 75 y/o  female with PMHx of parkinson's disease, HTN, HLD, depression, GERD, chronic pain (back, shoulder, foot), chronic constipation, c/o hard BMs about 2x weekly that requires straining. Patient reports going to the ER last month with acute onset of rectal prolapse that self reduced by the time she got there. Last colonoscopy 2014. Patient is able to ambulate short distances with walker, but is mostly wheelchair bound. Patient also reports recent +UTI that was treated with Macrobid. Patient to see Dr. Bruce tomorrow for pre-op eval, UA to be done. Patient presents to PST for a scheduled ERAS, laparoscopic robotic rectopexy on 8/28/2019.     ***pt reports she is being admitted 1 day prior to surgery (8/27/19) for colon prep 75 y/o  female with PMHx of parkinson's disease, HTN, HLD, depression, GERD, chronic pain (back, shoulder, foot), chronic constipation, c/o hard BMs about 2x weekly that requires straining. Patient reports going to the ER last month with acute onset of rectal prolapse that self reduced by the time she got there. Last colonoscopy 2014. Patient is able to ambulate short distances with walker, but is mostly wheelchair bound. Patient also reports recent +UTI that was treated with Macrobid. Patient to see Dr. Bruce tomorrow for pre-op eval, UA to be done. Patient presents to PST for a scheduled ERAS, laparoscopic robotic rectopexy on 8/28/2019.     ***pt reports she is being admitted 1 day prior to surgery (8/27/19) for colon prep**

## 2019-08-19 NOTE — H&P PST ADULT - ASSESSMENT
CAPRINI SCORE [CLOT updated 18]    AGE RELATED RISK FACTORS                                                       MOBILITY RELATED FACTORS  [ ] Age 41-60 years                                            (1 Point)                    [ ] Bed rest                                                        (1 Point)  [x] Age: 61-74 years                                           (2 Points)                  [ ] Plaster cast                                                   (2 Points)  [ ] Age= 75 years                                              (3 Points)                    [ ] Bed bound for more than 72 hours                 (2 Points)    DISEASE RELATED RISK FACTORS                                               GENDER SPECIFIC FACTORS  [x] Edema in the lower extremities                       (1 Point)              [ ] Pregnancy                                                     (1 Point)  [ ] Varicose veins                                               (1 Point)                     [ ] Post-partum < 6 weeks                                   (1 Point)             [x] BMI > 25 Kg/m2                                            (1 Point)                     [ ] Hormonal therapy  or oral contraception          (1 Point)                 [ ] Sepsis (in the previous month)                        (1 Point)               [ ] History of pregnancy complications                 (1 point)  [ ] Pneumonia or serious lung disease                                               [ ] Unexplained or recurrent                     (1 Point)           (in the previous month)                               (1 Point)  [ ] Abnormal pulmonary function test                     (1 Point)                 SURGERY RELATED RISK FACTORS  [ ] Acute myocardial infarction                              (1 Point)               [ ]  Section                                             (1 Point)  [ ] Congestive heart failure (in the previous month)  (1 Point)      [ ] Minor surgery                                                  (1 Point)   [ ] Inflammatory bowel disease                             (1 Point)               [ ] Arthroscopic surgery                                        (2 Points)  [ ] Central venous access                                      (2 Points)                [x] General surgery lasting more than 45 minutes (2 points)  [ ] Present or previous malignancy                     (2 Points)                [ ] Elective arthroplasty                                         (5 points)    [ ] Stroke (in the previous month)                          (5 Points)                                                                                                                                                           HEMATOLOGY RELATED FACTORS                                                 TRAUMA RELATED RISK FACTORS  [ ] Prior episodes of VTE                                     (3 Points)                [ ] Fracture of the hip, pelvis, or leg                       (5 Points)  [ ] Positive family history for VTE                         (3 Points)             [ ] Acute spinal cord injury (in the previous month)  (5 Points)  [ ] Prothrombin 93126 A                                     (3 Points)               [ ] Paralysis  (less than 1 month)                             (5 Points)  [ ] Factor V Leiden                                             (3 Points)                  [ ] Multiple Trauma within 1 month                        (5 Points)  [ ] Lupus anticoagulants                                     (3 Points)                                                           [ ] Anticardiolipin antibodies                               (3 Points)                                                       [ ] High homocysteine in the blood                      (3 Points)                                             [ ] Other congenital or acquired thrombophilia      (3 Points)                                                [ ] Heparin induced thrombocytopenia                  (3 Points)                                     Total Score [    6     ]

## 2019-08-19 NOTE — H&P PST ADULT - NSICDXPASTSURGICALHX_GEN_ALL_CORE_FT
PAST SURGICAL HISTORY:  H/O colonoscopy 2014    S/P Breast Biopsy     S/P BSO     S/P Bunionectomy     S/P cataract surgery both eyes    S/P knee replacement, left 2011    S/P LASIK surgery 8/2019    S/P tonsillectomy and adenoidectomy childhood    Shoulder pain s/p Rt shoulder prosthetic surgery

## 2019-08-19 NOTE — H&P PST ADULT - NSICDXPROBLEM_GEN_ALL_CORE_FT
PROBLEM DIAGNOSES  Problem: Rectal prolapse  Assessment and Plan: Scheduled ERAS, lap robotic rectopexy 8/28/19- pt to be admitted 1 day prior to surgery for colon prep  Pre-op instructions given to pt, lab work sent at PST  Pt with recent UTI, treated with Macrobid- Urine to be re-tested at PCP pre-op appt on 8/20/19- spoke to Dr. Bruce today. Pt reports that Dr. Brink aware.    Problem: Parkinsons  Assessment and Plan: Pt to continue medications    Problem: Encounter for pain management counseling  Assessment and Plan: Pain consult ordered    Problem: HTN (hypertension)  Assessment and Plan: Instructed pt to continue BP medication    Problem: Need for prophylactic measure  Assessment and Plan: The Caprini score indicates that this patient is at high risk for a VTE event (score 6 or greater). Surgical patients in this group will benefit from both pharmacologic prophylaxis and intermittent compression devices.  The surgical team will determine the balance between VTE risk and bleeding risk, and other clinical considerations

## 2019-08-28 ENCOUNTER — APPOINTMENT (OUTPATIENT)
Dept: COLORECTAL SURGERY | Facility: HOSPITAL | Age: 74
End: 2019-08-28

## 2019-08-31 ENCOUNTER — INPATIENT (INPATIENT)
Facility: HOSPITAL | Age: 74
LOS: 5 days | Discharge: NURSING, REHAB OR LONG-TERM CARE | DRG: 184 | End: 2019-09-06
Attending: SURGERY | Admitting: SURGERY
Payer: MEDICARE

## 2019-08-31 VITALS
HEIGHT: 58 IN | WEIGHT: 139.99 LBS | TEMPERATURE: 98 F | OXYGEN SATURATION: 95 % | DIASTOLIC BLOOD PRESSURE: 70 MMHG | HEART RATE: 86 BPM | SYSTOLIC BLOOD PRESSURE: 118 MMHG | RESPIRATION RATE: 16 BRPM

## 2019-08-31 DIAGNOSIS — Z98.890 OTHER SPECIFIED POSTPROCEDURAL STATES: Chronic | ICD-10-CM

## 2019-08-31 DIAGNOSIS — M25.519 PAIN IN UNSPECIFIED SHOULDER: Chronic | ICD-10-CM

## 2019-08-31 DIAGNOSIS — Z98.89 OTHER SPECIFIED POSTPROCEDURAL STATES: Chronic | ICD-10-CM

## 2019-08-31 DIAGNOSIS — Z98.49 CATARACT EXTRACTION STATUS, UNSPECIFIED EYE: Chronic | ICD-10-CM

## 2019-08-31 DIAGNOSIS — Z96.652 PRESENCE OF LEFT ARTIFICIAL KNEE JOINT: Chronic | ICD-10-CM

## 2019-08-31 PROCEDURE — 99285 EMERGENCY DEPT VISIT HI MDM: CPT | Mod: GC

## 2019-08-31 RX ORDER — MORPHINE SULFATE 50 MG/1
4 CAPSULE, EXTENDED RELEASE ORAL ONCE
Refills: 0 | Status: DISCONTINUED | OUTPATIENT
Start: 2019-08-31 | End: 2019-08-31

## 2019-08-31 NOTE — ED ADULT NURSE NOTE - OBJECTIVE STATEMENT
73 y/o female a+ox3, pmhx Parkinson's, HTN, arthritis, HLD, coming from home for left sided rib pain s/p fall a few days ago. Pt was walking with walker and "lost her balance" falling onto her left side; no LOC, pt did not hit her head, no blood thinner use. Fall took place in [facility], pt traveled back home this morning and went to Urgent Care; Xray found "multiple rib fractures" on left side. Pt denies chest pain or discomfort, headache, lightheadedness, dizziness, difficulty breathing, abdominal pain, n/v/d, fever or chills. IV established, labs obtained. Pt left in position of comfort, guard rails up, bed low and locked. Will reassess

## 2019-08-31 NOTE — ED PROVIDER NOTE - PSH
H/O colonoscopy  2014  S/P Breast Biopsy    S/P BSO    S/P Bunionectomy    S/P cataract surgery  both eyes  S/P knee replacement, left  2011  S/P LASIK surgery  8/2019  S/P tonsillectomy and adenoidectomy  childhood  Shoulder pain  s/p Rt shoulder prosthetic surgery

## 2019-08-31 NOTE — ED PROVIDER NOTE - CLINICAL SUMMARY MEDICAL DECISION MAKING FREE TEXT BOX
75 y/o F presenting >24hrs s/p mechanical fall with reported multiple broken ribs on CXR at urgent care. Will obtain labs, repeat cxr, morphine for pain, will consult surgery pending xray 75 y/o F presenting >24hrs s/p mechanical fall with reported multiple broken ribs on CXR at urgent care. Will obtain labs, repeat cxr, morphine for pain, will consult surgery pending xray. concern for rib fx as patient has pain with inspiration and reported broken ribs  Low concern for ptx pt has +lung sounds b/l, low concern for abdominal trauma nontender on exam 73 y/o F presenting >24hrs s/p mechanical fall with reported multiple broken ribs on CXR at urgent care. Will obtain labs, repeat cxr, morphine for pain, will consult surgery pending xray. concern for rib fx as patient has pain with inspiration and reported broken ribs  Low concern for ptx pt has +lung sounds b/l, low concern for abdominal trauma nontender on exam    ROCCO Cespedes MD: Pt is a 73 y/o female with PMH of HTN, HLD, Parkinson's, OA, chronic pain presents witb rib fxs s/p fall. Pt states that she had a mechanical fall yesterday 8/30 when ambulating with walker. Denies hitting head or LOC. Fell onto L side, had left sided chest pain which continued until today and pain with inspiration and movement. [place] dx with 3 L sided rib fractures on cxr. Not on anticoagulants/aspirin. Plan: repeat films, r/o PTX, pain control, trauma c/s, reassess

## 2019-08-31 NOTE — ED PROVIDER NOTE - NS ED ROS FT
CONSTITUTIONAL: No fevers, no chills, no lightheadedness, no dizziness  Eyes: no visual changes  Ears: no ear drainage, no ear pain  Nose: no nasal congestion  Mouth/Throat: no sore throat  CV: +chest pain, no palpitations  PULM: +pleurisy, no SOB, no cough  GI: No n/v/d, no abd pain  : no dysuria, no hematuria  SKIN: no rashes.  NEURO: no headache, no focal weakness or numbness  LYMPH/VASC: no LE swelling

## 2019-08-31 NOTE — ED PROVIDER NOTE - OBJECTIVE STATEMENT
73 y/o F with PMH of HTN, HLD, Parkinson's presents after fall yesterday 8/30 2 PM. Patient was walking with walker trying to turn and fell on left side. Denies hitting head or any other part of body. Had left sided chest pain which continued until today. Pain with inspiration and movement. No arm pain worse than baseline from chronic pain. Patient went to urgent care [email or web address] earlier and had 3 rib fractures on cxr so drove down to be evaluated in hospital.  No fevers, no LOC, abdominal pain, n/v, back pain. No anticoagulants/aspirin

## 2019-08-31 NOTE — ED PROVIDER NOTE - PHYSICAL EXAMINATION
gen: well appearing  Mentation: AAO x 3  psych: mood appropriate  ENT: airway patent  Eyes: conjunctivae clear bilaterally  Cardio: RRR, no m/r/g  Resp: normal BS b/l  Chest: +left chest wall tenderness  GI: s/nt/nd   Neuro: AAO x 3, sensation and motor function intact  Skin: No evidence of rash  MSK: normal movement of all extremities  Lymph/Vasc: no LE edema

## 2019-09-01 DIAGNOSIS — S22.39XA FRACTURE OF ONE RIB, UNSPECIFIED SIDE, INITIAL ENCOUNTER FOR CLOSED FRACTURE: ICD-10-CM

## 2019-09-01 PROBLEM — K59.09 OTHER CONSTIPATION: Chronic | Status: ACTIVE | Noted: 2019-08-19

## 2019-09-01 PROBLEM — E55.9 VITAMIN D DEFICIENCY, UNSPECIFIED: Chronic | Status: ACTIVE | Noted: 2019-08-19

## 2019-09-01 PROBLEM — G47.00 INSOMNIA, UNSPECIFIED: Chronic | Status: ACTIVE | Noted: 2019-08-19

## 2019-09-01 PROBLEM — K62.3 RECTAL PROLAPSE: Chronic | Status: ACTIVE | Noted: 2019-08-19

## 2019-09-01 LAB
ALBUMIN SERPL ELPH-MCNC: 4.3 G/DL — SIGNIFICANT CHANGE UP (ref 3.3–5)
ALP SERPL-CCNC: 64 U/L — SIGNIFICANT CHANGE UP (ref 40–120)
ALT FLD-CCNC: 6 U/L — LOW (ref 10–45)
ANION GAP SERPL CALC-SCNC: 12 MMOL/L — SIGNIFICANT CHANGE UP (ref 5–17)
ANION GAP SERPL CALC-SCNC: 13 MMOL/L — SIGNIFICANT CHANGE UP (ref 5–17)
APTT BLD: 34.5 SEC — SIGNIFICANT CHANGE UP (ref 27.5–36.3)
AST SERPL-CCNC: 23 U/L — SIGNIFICANT CHANGE UP (ref 10–40)
BASOPHILS # BLD AUTO: 0 K/UL — SIGNIFICANT CHANGE UP (ref 0–0.2)
BASOPHILS NFR BLD AUTO: 0.1 % — SIGNIFICANT CHANGE UP (ref 0–2)
BILIRUB SERPL-MCNC: 0.3 MG/DL — SIGNIFICANT CHANGE UP (ref 0.2–1.2)
BUN SERPL-MCNC: 18 MG/DL — SIGNIFICANT CHANGE UP (ref 7–23)
BUN SERPL-MCNC: 20 MG/DL — SIGNIFICANT CHANGE UP (ref 7–23)
CALCIUM SERPL-MCNC: 9.3 MG/DL — SIGNIFICANT CHANGE UP (ref 8.4–10.5)
CALCIUM SERPL-MCNC: 9.9 MG/DL — SIGNIFICANT CHANGE UP (ref 8.4–10.5)
CHLORIDE SERPL-SCNC: 101 MMOL/L — SIGNIFICANT CHANGE UP (ref 96–108)
CHLORIDE SERPL-SCNC: 101 MMOL/L — SIGNIFICANT CHANGE UP (ref 96–108)
CO2 SERPL-SCNC: 22 MMOL/L — SIGNIFICANT CHANGE UP (ref 22–31)
CO2 SERPL-SCNC: 23 MMOL/L — SIGNIFICANT CHANGE UP (ref 22–31)
CREAT SERPL-MCNC: 0.7 MG/DL — SIGNIFICANT CHANGE UP (ref 0.5–1.3)
CREAT SERPL-MCNC: 0.77 MG/DL — SIGNIFICANT CHANGE UP (ref 0.5–1.3)
EOSINOPHIL # BLD AUTO: 0.2 K/UL — SIGNIFICANT CHANGE UP (ref 0–0.5)
EOSINOPHIL NFR BLD AUTO: 1.7 % — SIGNIFICANT CHANGE UP (ref 0–6)
GLUCOSE SERPL-MCNC: 106 MG/DL — HIGH (ref 70–99)
GLUCOSE SERPL-MCNC: 131 MG/DL — HIGH (ref 70–99)
HCT VFR BLD CALC: 37 % — SIGNIFICANT CHANGE UP (ref 34.5–45)
HCT VFR BLD CALC: 39.5 % — SIGNIFICANT CHANGE UP (ref 34.5–45)
HGB BLD-MCNC: 12.4 G/DL — SIGNIFICANT CHANGE UP (ref 11.5–15.5)
HGB BLD-MCNC: 13.1 G/DL — SIGNIFICANT CHANGE UP (ref 11.5–15.5)
INR BLD: 0.91 RATIO — SIGNIFICANT CHANGE UP (ref 0.88–1.16)
LYMPHOCYTES # BLD AUTO: 2.3 K/UL — SIGNIFICANT CHANGE UP (ref 1–3.3)
LYMPHOCYTES # BLD AUTO: 21.6 % — SIGNIFICANT CHANGE UP (ref 13–44)
MAGNESIUM SERPL-MCNC: 2 MG/DL — SIGNIFICANT CHANGE UP (ref 1.6–2.6)
MCHC RBC-ENTMCNC: 30 PG — SIGNIFICANT CHANGE UP (ref 27–34)
MCHC RBC-ENTMCNC: 30.2 PG — SIGNIFICANT CHANGE UP (ref 27–34)
MCHC RBC-ENTMCNC: 33.2 GM/DL — SIGNIFICANT CHANGE UP (ref 32–36)
MCHC RBC-ENTMCNC: 33.4 GM/DL — SIGNIFICANT CHANGE UP (ref 32–36)
MCV RBC AUTO: 90.4 FL — SIGNIFICANT CHANGE UP (ref 80–100)
MCV RBC AUTO: 90.5 FL — SIGNIFICANT CHANGE UP (ref 80–100)
MONOCYTES # BLD AUTO: 0.7 K/UL — SIGNIFICANT CHANGE UP (ref 0–0.9)
MONOCYTES NFR BLD AUTO: 6.2 % — SIGNIFICANT CHANGE UP (ref 2–14)
NEUTROPHILS # BLD AUTO: 7.4 K/UL — SIGNIFICANT CHANGE UP (ref 1.8–7.4)
NEUTROPHILS NFR BLD AUTO: 70.4 % — SIGNIFICANT CHANGE UP (ref 43–77)
PHOSPHATE SERPL-MCNC: 3.1 MG/DL — SIGNIFICANT CHANGE UP (ref 2.5–4.5)
PLATELET # BLD AUTO: 210 K/UL — SIGNIFICANT CHANGE UP (ref 150–400)
PLATELET # BLD AUTO: 235 K/UL — SIGNIFICANT CHANGE UP (ref 150–400)
POTASSIUM SERPL-MCNC: 3.9 MMOL/L — SIGNIFICANT CHANGE UP (ref 3.5–5.3)
POTASSIUM SERPL-MCNC: 4.5 MMOL/L — SIGNIFICANT CHANGE UP (ref 3.5–5.3)
POTASSIUM SERPL-SCNC: 3.9 MMOL/L — SIGNIFICANT CHANGE UP (ref 3.5–5.3)
POTASSIUM SERPL-SCNC: 4.5 MMOL/L — SIGNIFICANT CHANGE UP (ref 3.5–5.3)
PROT SERPL-MCNC: 7 G/DL — SIGNIFICANT CHANGE UP (ref 6–8.3)
PROTHROM AB SERPL-ACNC: 10.4 SEC — SIGNIFICANT CHANGE UP (ref 10–12.9)
RBC # BLD: 4.09 M/UL — SIGNIFICANT CHANGE UP (ref 3.8–5.2)
RBC # BLD: 4.36 M/UL — SIGNIFICANT CHANGE UP (ref 3.8–5.2)
RBC # FLD: 12.5 % — SIGNIFICANT CHANGE UP (ref 10.3–14.5)
RBC # FLD: 12.6 % — SIGNIFICANT CHANGE UP (ref 10.3–14.5)
SODIUM SERPL-SCNC: 136 MMOL/L — SIGNIFICANT CHANGE UP (ref 135–145)
SODIUM SERPL-SCNC: 136 MMOL/L — SIGNIFICANT CHANGE UP (ref 135–145)
WBC # BLD: 10.5 K/UL — SIGNIFICANT CHANGE UP (ref 3.8–10.5)
WBC # BLD: 8.4 K/UL — SIGNIFICANT CHANGE UP (ref 3.8–10.5)
WBC # FLD AUTO: 10.5 K/UL — SIGNIFICANT CHANGE UP (ref 3.8–10.5)
WBC # FLD AUTO: 8.4 K/UL — SIGNIFICANT CHANGE UP (ref 3.8–10.5)

## 2019-09-01 PROCEDURE — 71045 X-RAY EXAM CHEST 1 VIEW: CPT | Mod: 26,59

## 2019-09-01 PROCEDURE — 71100 X-RAY EXAM RIBS UNI 2 VIEWS: CPT | Mod: 26

## 2019-09-01 PROCEDURE — 71250 CT THORAX DX C-: CPT | Mod: 26

## 2019-09-01 RX ORDER — DOCUSATE SODIUM 100 MG
100 CAPSULE ORAL THREE TIMES A DAY
Refills: 0 | Status: DISCONTINUED | OUTPATIENT
Start: 2019-09-01 | End: 2019-09-06

## 2019-09-01 RX ORDER — MORPHINE SULFATE 50 MG/1
15 CAPSULE, EXTENDED RELEASE ORAL
Refills: 0 | Status: DISCONTINUED | OUTPATIENT
Start: 2019-09-01 | End: 2019-09-02

## 2019-09-01 RX ORDER — MORPHINE SULFATE 50 MG/1
15 CAPSULE, EXTENDED RELEASE ORAL
Refills: 0 | Status: DISCONTINUED | OUTPATIENT
Start: 2019-09-01 | End: 2019-09-01

## 2019-09-01 RX ORDER — OXYCODONE HYDROCHLORIDE 5 MG/1
5 TABLET ORAL EVERY 8 HOURS
Refills: 0 | Status: DISCONTINUED | OUTPATIENT
Start: 2019-09-01 | End: 2019-09-03

## 2019-09-01 RX ORDER — MORPHINE SULFATE 50 MG/1
1 CAPSULE, EXTENDED RELEASE ORAL
Qty: 0 | Refills: 0 | DISCHARGE

## 2019-09-01 RX ORDER — ACETAMINOPHEN 500 MG
975 TABLET ORAL ONCE
Refills: 0 | Status: COMPLETED | OUTPATIENT
Start: 2019-09-01 | End: 2019-09-01

## 2019-09-01 RX ORDER — CARBIDOPA AND LEVODOPA 25; 100 MG/1; MG/1
1 TABLET ORAL
Refills: 0 | Status: DISCONTINUED | OUTPATIENT
Start: 2019-09-01 | End: 2019-09-06

## 2019-09-01 RX ORDER — POTASSIUM CHLORIDE 20 MEQ
20 PACKET (EA) ORAL ONCE
Refills: 0 | Status: COMPLETED | OUTPATIENT
Start: 2019-09-01 | End: 2019-09-01

## 2019-09-01 RX ORDER — MORPHINE SULFATE 50 MG/1
30 CAPSULE, EXTENDED RELEASE ORAL ONCE
Refills: 0 | Status: DISCONTINUED | OUTPATIENT
Start: 2019-09-01 | End: 2019-09-01

## 2019-09-01 RX ORDER — MORPHINE SULFATE 50 MG/1
30 CAPSULE, EXTENDED RELEASE ORAL
Refills: 0 | Status: DISCONTINUED | OUTPATIENT
Start: 2019-09-01 | End: 2019-09-01

## 2019-09-01 RX ORDER — PANTOPRAZOLE SODIUM 20 MG/1
40 TABLET, DELAYED RELEASE ORAL
Refills: 0 | Status: DISCONTINUED | OUTPATIENT
Start: 2019-09-01 | End: 2019-09-06

## 2019-09-01 RX ORDER — MORPHINE SULFATE 50 MG/1
30 CAPSULE, EXTENDED RELEASE ORAL
Refills: 0 | Status: DISCONTINUED | OUTPATIENT
Start: 2019-09-01 | End: 2019-09-06

## 2019-09-01 RX ORDER — ZOLPIDEM TARTRATE 10 MG/1
5 TABLET ORAL AT BEDTIME
Refills: 0 | Status: DISCONTINUED | OUTPATIENT
Start: 2019-09-01 | End: 2019-09-04

## 2019-09-01 RX ORDER — LORATADINE 10 MG/1
10 TABLET ORAL DAILY
Refills: 0 | Status: DISCONTINUED | OUTPATIENT
Start: 2019-09-01 | End: 2019-09-06

## 2019-09-01 RX ORDER — ACETAMINOPHEN 500 MG
650 TABLET ORAL EVERY 6 HOURS
Refills: 0 | Status: DISCONTINUED | OUTPATIENT
Start: 2019-09-01 | End: 2019-09-06

## 2019-09-01 RX ORDER — IBUPROFEN 200 MG
400 TABLET ORAL EVERY 6 HOURS
Refills: 0 | Status: DISCONTINUED | OUTPATIENT
Start: 2019-09-01 | End: 2019-09-06

## 2019-09-01 RX ORDER — DULOXETINE HYDROCHLORIDE 30 MG/1
60 CAPSULE, DELAYED RELEASE ORAL DAILY
Refills: 0 | Status: DISCONTINUED | OUTPATIENT
Start: 2019-09-01 | End: 2019-09-06

## 2019-09-01 RX ORDER — ATORVASTATIN CALCIUM 80 MG/1
40 TABLET, FILM COATED ORAL AT BEDTIME
Refills: 0 | Status: DISCONTINUED | OUTPATIENT
Start: 2019-09-01 | End: 2019-09-06

## 2019-09-01 RX ORDER — MORPHINE SULFATE 50 MG/1
30 CAPSULE, EXTENDED RELEASE ORAL
Qty: 0 | Refills: 0 | DISCHARGE

## 2019-09-01 RX ORDER — AMILORIDE HCL 5 MG
10 TABLET ORAL DAILY
Refills: 0 | Status: DISCONTINUED | OUTPATIENT
Start: 2019-09-01 | End: 2019-09-06

## 2019-09-01 RX ORDER — CALCIUM CARBONATE 500(1250)
1 TABLET ORAL DAILY
Refills: 0 | Status: DISCONTINUED | OUTPATIENT
Start: 2019-09-01 | End: 2019-09-06

## 2019-09-01 RX ORDER — ZOLPIDEM TARTRATE 10 MG/1
5 TABLET ORAL AT BEDTIME
Refills: 0 | Status: DISCONTINUED | OUTPATIENT
Start: 2019-09-01 | End: 2019-09-01

## 2019-09-01 RX ADMIN — ZOLPIDEM TARTRATE 5 MILLIGRAM(S): 10 TABLET ORAL at 22:22

## 2019-09-01 RX ADMIN — Medication 650 MILLIGRAM(S): at 19:15

## 2019-09-01 RX ADMIN — MORPHINE SULFATE 30 MILLIGRAM(S): 50 CAPSULE, EXTENDED RELEASE ORAL at 03:36

## 2019-09-01 RX ADMIN — MORPHINE SULFATE 30 MILLIGRAM(S): 50 CAPSULE, EXTENDED RELEASE ORAL at 20:32

## 2019-09-01 RX ADMIN — DULOXETINE HYDROCHLORIDE 60 MILLIGRAM(S): 30 CAPSULE, DELAYED RELEASE ORAL at 13:44

## 2019-09-01 RX ADMIN — Medication 20 MILLIEQUIVALENT(S): at 18:47

## 2019-09-01 RX ADMIN — PANTOPRAZOLE SODIUM 40 MILLIGRAM(S): 20 TABLET, DELAYED RELEASE ORAL at 07:17

## 2019-09-01 RX ADMIN — ATORVASTATIN CALCIUM 40 MILLIGRAM(S): 80 TABLET, FILM COATED ORAL at 22:22

## 2019-09-01 RX ADMIN — Medication 1 TABLET(S): at 22:22

## 2019-09-01 RX ADMIN — Medication 650 MILLIGRAM(S): at 18:45

## 2019-09-01 RX ADMIN — MORPHINE SULFATE 30 MILLIGRAM(S): 50 CAPSULE, EXTENDED RELEASE ORAL at 04:03

## 2019-09-01 RX ADMIN — Medication 400 MILLIGRAM(S): at 19:15

## 2019-09-01 RX ADMIN — Medication 100 MILLIGRAM(S): at 22:22

## 2019-09-01 RX ADMIN — MORPHINE SULFATE 4 MILLIGRAM(S): 50 CAPSULE, EXTENDED RELEASE ORAL at 03:16

## 2019-09-01 RX ADMIN — MORPHINE SULFATE 30 MILLIGRAM(S): 50 CAPSULE, EXTENDED RELEASE ORAL at 21:30

## 2019-09-01 RX ADMIN — Medication 1 TABLET(S): at 13:45

## 2019-09-01 RX ADMIN — CARBIDOPA AND LEVODOPA 1 TABLET(S): 25; 100 TABLET ORAL at 13:44

## 2019-09-01 RX ADMIN — Medication 100 MILLIGRAM(S): at 13:48

## 2019-09-01 RX ADMIN — MORPHINE SULFATE 15 MILLIGRAM(S): 50 CAPSULE, EXTENDED RELEASE ORAL at 16:37

## 2019-09-01 RX ADMIN — CARBIDOPA AND LEVODOPA 1 TABLET(S): 25; 100 TABLET ORAL at 18:45

## 2019-09-01 RX ADMIN — MORPHINE SULFATE 15 MILLIGRAM(S): 50 CAPSULE, EXTENDED RELEASE ORAL at 16:07

## 2019-09-01 RX ADMIN — MORPHINE SULFATE 4 MILLIGRAM(S): 50 CAPSULE, EXTENDED RELEASE ORAL at 00:34

## 2019-09-01 RX ADMIN — Medication 400 MILLIGRAM(S): at 18:45

## 2019-09-01 NOTE — H&P ADULT - ASSESSMENT
74F w/ PMH of HTN, HLD, Parkinson's, s/p unwitnessed fall, found to have displaced displaced 5th rib fx, nondisplaced 6, 7 rib fx    - Admit to [facility], Dr. Moran  - multimodal pain control for displaced rib fracture. Pt takes 15/30mg PO morphine at home as need for arthritis.  - continue incentive spirometer and OOB/ambulate  - pt resumed on home medication  - f/u CT abdomen, ordered in ED to r/o spleen injury  - discussed w/ Dr. Dean Rodriguez PGY-2  [facility]

## 2019-09-01 NOTE — H&P ADULT - HISTORY OF PRESENT ILLNESS
74F w/ PMH of HTN, HLD, arthritis, and Parkinson's presents s/p unwitnessed fall yesterday at around 2pm. Pt was in [facility] for a family trip, was in a kitchen turning her rolling walker around to change direction, and tripped and fell in the process, landing on her left chest.  was next door and came over right away to find her on the floor. Pt went to urgent care, CXR showed displaced fracture of 3rd rib. Pt and her  drove down to [facility] since she lives in [place].    In the ED, pt's vitals were wnl and stable, physical exam significant for focal tenderness on Left lateral chest wall, and pulling >1,500 on incentive spirometer w/ mild inspiratory pain. Labs were not significant for any values, and imaging of CXR repeated, again demonstrating displaced 3rd rib fracture on left chest. CT chest was obtained showing displaced lateral 5th rib fx, and nondisplaced left 6th and 7th rib fx

## 2019-09-01 NOTE — H&P ADULT - NSHPPHYSICALEXAM_GEN_ALL_CORE
General: NAD, Sitting in bed comfortably  HEENT: NC/AT, EOMI  Neck: Soft, supple  Cardio: RRR, nml S1/S2  Resp: Good effort, CTA b/l  Thorax: Left lateral chest wall tenderness at inframammillary fold  GI/Abd: Soft, NT/ND, no rebound/guarding, no masses palpated  Skin: Intact, no breakdown  Lymphatic/Nodes: No palpable lymphadenopathy  Musculoskeletal: All 4 extremities moving spontaneously, no limitations

## 2019-09-01 NOTE — H&P ADULT - NSHPLABSRESULTS_GEN_ALL_CORE
Vital Signs Last 24 Hrs  T(C): 36.5 (31 Aug 2019 21:32), Max: 36.5 (31 Aug 2019 21:32)  T(F): 97.7 (31 Aug 2019 21:32), Max: 97.7 (31 Aug 2019 21:32)  HR: 73 (01 Sep 2019 00:33) (73 - 87)  BP: 122/56 (01 Sep 2019 00:33) (118/70 - 151/88)  BP(mean): --  RR: 16 (01 Sep 2019 00:33) (16 - 20)  SpO2: 94% (01 Sep 2019 00:33) (94% - 96%)      LABS:                        13.1   10.5  )-----------( 235      ( 01 Sep 2019 00:26 )             39.5     09-01    136  |  101  |  20  ----------------------------<  106<H>  4.5   |  22  |  0.77    Ca    9.9      01 Sep 2019 00:26    TPro  7.0  /  Alb  4.3  /  TBili  0.3  /  DBili  x   /  AST  23  /  ALT  6<L>  /  AlkPhos  64  09-01    PT/INR - ( 01 Sep 2019 00:26 )   PT: 10.4 sec;   INR: 0.91 ratio         PTT - ( 01 Sep 2019 00:26 )  PTT:34.5 sec      INs and OUTs:

## 2019-09-01 NOTE — H&P ADULT - NSICDXPASTMEDICALHX_GEN_ALL_CORE_FT
PAST MEDICAL HISTORY:  C. difficile diarrhea 2013    Chronic constipation     DDD (Degenerative Disc Disease) chronic pain    GERD (gastroesophageal reflux disease)     History of Osteoarthritis     History of Rotator Cuff Tear right    History of Spinal Stenosis     HTN (Hypertension)     Hx MRSA Infection 3 yrs ago in right second toe tx with abx    Hyperlipidemia     Insomnia     MVP (Mitral Valve Prolapse) benign    Parkinsons x 10 yrs    PWS (Port Wine Stain)     Rectal prolapse     Vitamin D deficiency

## 2019-09-02 LAB
ANION GAP SERPL CALC-SCNC: 12 MMOL/L — SIGNIFICANT CHANGE UP (ref 5–17)
BUN SERPL-MCNC: 19 MG/DL — SIGNIFICANT CHANGE UP (ref 7–23)
CALCIUM SERPL-MCNC: 9.1 MG/DL — SIGNIFICANT CHANGE UP (ref 8.4–10.5)
CHLORIDE SERPL-SCNC: 105 MMOL/L — SIGNIFICANT CHANGE UP (ref 96–108)
CO2 SERPL-SCNC: 23 MMOL/L — SIGNIFICANT CHANGE UP (ref 22–31)
CREAT SERPL-MCNC: 0.77 MG/DL — SIGNIFICANT CHANGE UP (ref 0.5–1.3)
GLUCOSE SERPL-MCNC: 146 MG/DL — HIGH (ref 70–99)
HCT VFR BLD CALC: 41 % — SIGNIFICANT CHANGE UP (ref 34.5–45)
HCV AB S/CO SERPL IA: 0.18 S/CO — SIGNIFICANT CHANGE UP (ref 0–0.99)
HCV AB SERPL-IMP: SIGNIFICANT CHANGE UP
HGB BLD-MCNC: 13.3 G/DL — SIGNIFICANT CHANGE UP (ref 11.5–15.5)
MAGNESIUM SERPL-MCNC: 2.1 MG/DL — SIGNIFICANT CHANGE UP (ref 1.6–2.6)
MCHC RBC-ENTMCNC: 29.4 PG — SIGNIFICANT CHANGE UP (ref 27–34)
MCHC RBC-ENTMCNC: 32.4 GM/DL — SIGNIFICANT CHANGE UP (ref 32–36)
MCV RBC AUTO: 90.5 FL — SIGNIFICANT CHANGE UP (ref 80–100)
PHOSPHATE SERPL-MCNC: 3.4 MG/DL — SIGNIFICANT CHANGE UP (ref 2.5–4.5)
PLATELET # BLD AUTO: 216 K/UL — SIGNIFICANT CHANGE UP (ref 150–400)
POTASSIUM SERPL-MCNC: 4.3 MMOL/L — SIGNIFICANT CHANGE UP (ref 3.5–5.3)
POTASSIUM SERPL-SCNC: 4.3 MMOL/L — SIGNIFICANT CHANGE UP (ref 3.5–5.3)
RBC # BLD: 4.53 M/UL — SIGNIFICANT CHANGE UP (ref 3.8–5.2)
RBC # FLD: 14.2 % — SIGNIFICANT CHANGE UP (ref 10.3–14.5)
SODIUM SERPL-SCNC: 140 MMOL/L — SIGNIFICANT CHANGE UP (ref 135–145)
WBC # BLD: 8.05 K/UL — SIGNIFICANT CHANGE UP (ref 3.8–10.5)
WBC # FLD AUTO: 8.05 K/UL — SIGNIFICANT CHANGE UP (ref 3.8–10.5)

## 2019-09-02 PROCEDURE — 71045 X-RAY EXAM CHEST 1 VIEW: CPT | Mod: 26

## 2019-09-02 PROCEDURE — 99232 SBSQ HOSP IP/OBS MODERATE 35: CPT

## 2019-09-02 RX ORDER — MORPHINE SULFATE 50 MG/1
15 CAPSULE, EXTENDED RELEASE ORAL THREE TIMES A DAY
Refills: 0 | Status: DISCONTINUED | OUTPATIENT
Start: 2019-09-02 | End: 2019-09-03

## 2019-09-02 RX ORDER — LIDOCAINE 4 G/100G
1 CREAM TOPICAL ONCE
Refills: 0 | Status: COMPLETED | OUTPATIENT
Start: 2019-09-02 | End: 2019-09-02

## 2019-09-02 RX ORDER — NYSTATIN CREAM 100000 [USP'U]/G
1 CREAM TOPICAL ONCE
Refills: 0 | Status: COMPLETED | OUTPATIENT
Start: 2019-09-02 | End: 2019-09-02

## 2019-09-02 RX ADMIN — MORPHINE SULFATE 15 MILLIGRAM(S): 50 CAPSULE, EXTENDED RELEASE ORAL at 11:45

## 2019-09-02 RX ADMIN — CARBIDOPA AND LEVODOPA 1 TABLET(S): 25; 100 TABLET ORAL at 17:46

## 2019-09-02 RX ADMIN — CARBIDOPA AND LEVODOPA 1 TABLET(S): 25; 100 TABLET ORAL at 01:12

## 2019-09-02 RX ADMIN — Medication 650 MILLIGRAM(S): at 05:18

## 2019-09-02 RX ADMIN — Medication 650 MILLIGRAM(S): at 23:03

## 2019-09-02 RX ADMIN — Medication 10 MILLIGRAM(S): at 11:46

## 2019-09-02 RX ADMIN — DULOXETINE HYDROCHLORIDE 60 MILLIGRAM(S): 30 CAPSULE, DELAYED RELEASE ORAL at 11:45

## 2019-09-02 RX ADMIN — LIDOCAINE 1 PATCH: 4 CREAM TOPICAL at 14:29

## 2019-09-02 RX ADMIN — Medication 1 TABLET(S): at 11:45

## 2019-09-02 RX ADMIN — PANTOPRAZOLE SODIUM 40 MILLIGRAM(S): 20 TABLET, DELAYED RELEASE ORAL at 05:21

## 2019-09-02 RX ADMIN — Medication 400 MILLIGRAM(S): at 14:58

## 2019-09-02 RX ADMIN — Medication 400 MILLIGRAM(S): at 20:36

## 2019-09-02 RX ADMIN — Medication 650 MILLIGRAM(S): at 06:20

## 2019-09-02 RX ADMIN — Medication 100 MILLIGRAM(S): at 22:39

## 2019-09-02 RX ADMIN — ATORVASTATIN CALCIUM 40 MILLIGRAM(S): 80 TABLET, FILM COATED ORAL at 22:39

## 2019-09-02 RX ADMIN — Medication 650 MILLIGRAM(S): at 17:46

## 2019-09-02 RX ADMIN — CARBIDOPA AND LEVODOPA 1 TABLET(S): 25; 100 TABLET ORAL at 11:46

## 2019-09-02 RX ADMIN — Medication 100 MILLIGRAM(S): at 14:29

## 2019-09-02 RX ADMIN — MORPHINE SULFATE 30 MILLIGRAM(S): 50 CAPSULE, EXTENDED RELEASE ORAL at 11:45

## 2019-09-02 RX ADMIN — NYSTATIN CREAM 1 APPLICATION(S): 100000 CREAM TOPICAL at 14:29

## 2019-09-02 RX ADMIN — CARBIDOPA AND LEVODOPA 1 TABLET(S): 25; 100 TABLET ORAL at 23:03

## 2019-09-02 RX ADMIN — Medication 650 MILLIGRAM(S): at 02:15

## 2019-09-02 RX ADMIN — MORPHINE SULFATE 30 MILLIGRAM(S): 50 CAPSULE, EXTENDED RELEASE ORAL at 20:35

## 2019-09-02 RX ADMIN — Medication 100 MILLIGRAM(S): at 05:18

## 2019-09-02 RX ADMIN — LIDOCAINE 1 PATCH: 4 CREAM TOPICAL at 17:20

## 2019-09-02 RX ADMIN — Medication 650 MILLIGRAM(S): at 01:12

## 2019-09-02 RX ADMIN — Medication 650 MILLIGRAM(S): at 18:16

## 2019-09-02 RX ADMIN — MORPHINE SULFATE 30 MILLIGRAM(S): 50 CAPSULE, EXTENDED RELEASE ORAL at 12:15

## 2019-09-02 RX ADMIN — LORATADINE 10 MILLIGRAM(S): 10 TABLET ORAL at 05:18

## 2019-09-02 RX ADMIN — Medication 400 MILLIGRAM(S): at 14:28

## 2019-09-02 RX ADMIN — Medication 400 MILLIGRAM(S): at 20:35

## 2019-09-02 RX ADMIN — CARBIDOPA AND LEVODOPA 1 TABLET(S): 25; 100 TABLET ORAL at 05:18

## 2019-09-02 RX ADMIN — MORPHINE SULFATE 15 MILLIGRAM(S): 50 CAPSULE, EXTENDED RELEASE ORAL at 12:15

## 2019-09-02 NOTE — PROGRESS NOTE ADULT - SUBJECTIVE AND OBJECTIVE BOX
TRAUMA SURGERY DAILY PROGRESS NOTE:    Interval:  No acute events overnight endorsed.    Subjective:  Patient seen and examined this am. Counselled PT IS pulling 500 on IS    Vital Signs Last 24 Hrs  T(C): 36.7 (02 Sep 2019 12:37), Max: 36.9 (02 Sep 2019 05:12)  T(F): 98 (02 Sep 2019 12:37), Max: 98.4 (02 Sep 2019 05:12)  HR: 67 (02 Sep 2019 13:51) (67 - 74)  BP: 123/84 (02 Sep 2019 13:51) (94/59 - 123/84)  BP(mean): --  RR: 18 (02 Sep 2019 13:51) (17 - 18)  SpO2: 94% (02 Sep 2019 13:51) (93% - 94%)    Exam:  Gen: NAD, resting in bed, alert and responding appropriately  Resp: Airway patent, non-labored respirations  Abd: Soft, ND, NTTP x 4 quadrants, no rebound or guarding.   Ext: No edema, WWP  Neuro: AAOx3, no focal deficits    I&O's Detail    01 Sep 2019 07:01  -  02 Sep 2019 07:00  --------------------------------------------------------  IN:    Oral Fluid: 840 mL  Total IN: 840 mL    OUT:    Voided: 900 mL  Total OUT: 900 mL    Total NET: -60 mL      02 Sep 2019 07:01  -  02 Sep 2019 14:02  --------------------------------------------------------  IN:    Oral Fluid: 720 mL  Total IN: 720 mL    OUT:  Total OUT: 0 mL    Total NET: 720 mL          Daily     Daily     MEDICATIONS  (STANDING):  acetaminophen   Tablet .. 650 milliGRAM(s) Oral every 6 hours  aMILoride 10 milliGRAM(s) Oral daily  atorvastatin 40 milliGRAM(s) Oral at bedtime  calcium carbonate   1250 mG (OsCal) 1 Tablet(s) Oral daily  carbidopa/levodopa  25/100 1 Tablet(s) Oral four times a day  docusate sodium 100 milliGRAM(s) Oral three times a day  DULoxetine 60 milliGRAM(s) Oral daily  ibuprofen  Tablet. 400 milliGRAM(s) Oral every 6 hours  lidocaine   Patch 1 Patch Transdermal once  loratadine 10 milliGRAM(s) Oral daily  morphine ER Tablet 30 milliGRAM(s) Oral <User Schedule>  multivitamin 1 Tablet(s) Oral daily  nystatin Powder 1 Application(s) Topical once  pantoprazole    Tablet 40 milliGRAM(s) Oral before breakfast    MEDICATIONS  (PRN):  morphine  IR 15 milliGRAM(s) Oral three times a day PRN Mild Pain (1 - 3)  oxyCODONE    IR 5 milliGRAM(s) Oral every 8 hours PRN Moderate Pain (4 - 6)  zolpidem 5 milliGRAM(s) Oral at bedtime PRN Insomnia      LABS:                        13.3   8.05  )-----------( 216      ( 02 Sep 2019 08:54 )             41.0     09-02    140  |  105  |  19  ----------------------------<  146<H>  4.3   |  23  |  0.77    Ca    9.1      02 Sep 2019 06:15  Phos  3.4     09-02  Mg     2.1     09-02    TPro  7.0  /  Alb  4.3  /  TBili  0.3  /  DBili  x   /  AST  23  /  ALT  6<L>  /  AlkPhos  64  09-01    PT/INR - ( 01 Sep 2019 00:26 )   PT: 10.4 sec;   INR: 0.91 ratio         PTT - ( 01 Sep 2019 00:26 )  PTT:34.5 sec      AMANDA Shrestha, PGY-1  [facility]  [id number] with any questions TRAUMA SURGERY DAILY PROGRESS NOTE:    Interval:  No acute events overnight endorsed.    Subjective:  Patient seen and examined this am. Pt pulling 500cc on IS. Counselled regarding PT and IS     Vital Signs Last 24 Hrs  T(C): 36.7 (02 Sep 2019 12:37), Max: 36.9 (02 Sep 2019 05:12)  T(F): 98 (02 Sep 2019 12:37), Max: 98.4 (02 Sep 2019 05:12)  HR: 67 (02 Sep 2019 13:51) (67 - 74)  BP: 123/84 (02 Sep 2019 13:51) (94/59 - 123/84)  BP(mean): --  RR: 18 (02 Sep 2019 13:51) (17 - 18)  SpO2: 94% (02 Sep 2019 13:51) (93% - 94%)    Exam:  Gen: NAD, resting in bed, alert and responding appropriately  Resp: Airway patent, non-labored respirations  Abd: Soft, ND, NTTP x 4 quadrants, no rebound or guarding.   Ext: No edema, WWP  Neuro: AAOx3, no focal deficits    I&O's Detail    01 Sep 2019 07:01  -  02 Sep 2019 07:00  --------------------------------------------------------  IN:    Oral Fluid: 840 mL  Total IN: 840 mL    OUT:    Voided: 900 mL  Total OUT: 900 mL    Total NET: -60 mL      02 Sep 2019 07:01  -  02 Sep 2019 14:02  --------------------------------------------------------  IN:    Oral Fluid: 720 mL  Total IN: 720 mL    OUT:  Total OUT: 0 mL    Total NET: 720 mL          Daily     Daily     MEDICATIONS  (STANDING):  acetaminophen   Tablet .. 650 milliGRAM(s) Oral every 6 hours  aMILoride 10 milliGRAM(s) Oral daily  atorvastatin 40 milliGRAM(s) Oral at bedtime  calcium carbonate   1250 mG (OsCal) 1 Tablet(s) Oral daily  carbidopa/levodopa  25/100 1 Tablet(s) Oral four times a day  docusate sodium 100 milliGRAM(s) Oral three times a day  DULoxetine 60 milliGRAM(s) Oral daily  ibuprofen  Tablet. 400 milliGRAM(s) Oral every 6 hours  lidocaine   Patch 1 Patch Transdermal once  loratadine 10 milliGRAM(s) Oral daily  morphine ER Tablet 30 milliGRAM(s) Oral <User Schedule>  multivitamin 1 Tablet(s) Oral daily  nystatin Powder 1 Application(s) Topical once  pantoprazole    Tablet 40 milliGRAM(s) Oral before breakfast    MEDICATIONS  (PRN):  morphine  IR 15 milliGRAM(s) Oral three times a day PRN Mild Pain (1 - 3)  oxyCODONE    IR 5 milliGRAM(s) Oral every 8 hours PRN Moderate Pain (4 - 6)  zolpidem 5 milliGRAM(s) Oral at bedtime PRN Insomnia      LABS:                        13.3   8.05  )-----------( 216      ( 02 Sep 2019 08:54 )             41.0     09-02    140  |  105  |  19  ----------------------------<  146<H>  4.3   |  23  |  0.77    Ca    9.1      02 Sep 2019 06:15  Phos  3.4     09-02  Mg     2.1     09-02    TPro  7.0  /  Alb  4.3  /  TBili  0.3  /  DBili  x   /  AST  23  /  ALT  6<L>  /  AlkPhos  64  09-01    PT/INR - ( 01 Sep 2019 00:26 )   PT: 10.4 sec;   INR: 0.91 ratio         PTT - ( 01 Sep 2019 00:26 )  PTT:34.5 sec      AMANDA Shrestha, PGY-1  [facility]  [id number] with any questions

## 2019-09-02 NOTE — PHYSICAL THERAPY INITIAL EVALUATION ADULT - BALANCE TRAINING, PT EVAL
GOAL: Pt will improve balance during (static/dynamic) (sitting/standing) activities by at least 1 balance grade within 3-4 weeks to assist with greater independence during functional mobility and ADL's.

## 2019-09-02 NOTE — PHYSICAL THERAPY INITIAL EVALUATION ADULT - ACTIVE RANGE OF MOTION EXAMINATION, REHAB EVAL
bilateral  lower extremity Active ROM was WFL (within functional limits)/B shoulder limited 50% L shoulder ROM increases pain in ribs

## 2019-09-02 NOTE — PROGRESS NOTE ADULT - ASSESSMENT
74F sp fall w displaced fx of 3rd rib L displaced lateral 5th rib nondisplaced fx L 6th 7th rib fx    Plan:  - PT  - Pain control IR morphine TID  - CXR lungs clear displaced L 3rd rib fx sp R shoulder arthroplasty  - Nystatin

## 2019-09-02 NOTE — PHYSICAL THERAPY INITIAL EVALUATION ADULT - PERTINENT HX OF CURRENT PROBLEM, REHAB EVAL
74F w/ PMH of HTN, HLD, Parkinson's, R shoulder replacement, s/p unwitnessed fall while turning her walker. Pt sustaining L 3rd rib fx. Xray: +mildly displaced left third posterior rib fracture. CXR: +There is severe degenerative joint disease of the left shoulder.

## 2019-09-02 NOTE — PHYSICAL THERAPY INITIAL EVALUATION ADULT - GENERAL OBSERVATIONS, REHAB EVAL
Pt rec'd semi supine in bed in NAD,  bedside, VSS, appears anxious to move, reporting pain when moving around.

## 2019-09-02 NOTE — PROGRESS NOTE ADULT - ATTENDING COMMENTS
Pt seen and examined, agree with above. Pt still having significant chest wall pain with deep breaths, cough, movement, and IS use. Was on her home regimen of pain medications, with Morphine ER 30mg BID and morphine IR 15mg daily. Will increase Morphine IR to 15mg three times per day. Continue Tylenol, lidocaine patch, motrin. CXR today without interval development of hemo/pneumothorax.

## 2019-09-02 NOTE — PHYSICAL THERAPY INITIAL EVALUATION ADULT - ADDITIONAL COMMENTS
Pt lives in first floor apartment with , no steps to enter. Pt has HHA 4hrs, 6days a week. Prior to admission pt requires assistance with ADLs including wiping, and dressing. Pt was amb short distances with a RW, pt also uses a motorized scooter in home, or WC. Pt has orthotics, however pt states she does not use them as they do not fit in her shoes she likes to wear. Pt would like to return home upon DC with home PT for strengthening, gait and balance.

## 2019-09-03 ENCOUNTER — TRANSCRIPTION ENCOUNTER (OUTPATIENT)
Age: 74
End: 2019-09-03

## 2019-09-03 LAB
ANION GAP SERPL CALC-SCNC: 13 MMOL/L — SIGNIFICANT CHANGE UP (ref 5–17)
BUN SERPL-MCNC: 25 MG/DL — HIGH (ref 7–23)
CALCIUM SERPL-MCNC: 9.6 MG/DL — SIGNIFICANT CHANGE UP (ref 8.4–10.5)
CHLORIDE SERPL-SCNC: 104 MMOL/L — SIGNIFICANT CHANGE UP (ref 96–108)
CO2 SERPL-SCNC: 25 MMOL/L — SIGNIFICANT CHANGE UP (ref 22–31)
CREAT SERPL-MCNC: 0.91 MG/DL — SIGNIFICANT CHANGE UP (ref 0.5–1.3)
GLUCOSE SERPL-MCNC: 86 MG/DL — SIGNIFICANT CHANGE UP (ref 70–99)
HCT VFR BLD CALC: 39.5 % — SIGNIFICANT CHANGE UP (ref 34.5–45)
HGB BLD-MCNC: 12.2 G/DL — SIGNIFICANT CHANGE UP (ref 11.5–15.5)
MAGNESIUM SERPL-MCNC: 2.1 MG/DL — SIGNIFICANT CHANGE UP (ref 1.6–2.6)
MCHC RBC-ENTMCNC: 28 PG — SIGNIFICANT CHANGE UP (ref 27–34)
MCHC RBC-ENTMCNC: 30.9 GM/DL — LOW (ref 32–36)
MCV RBC AUTO: 90.8 FL — SIGNIFICANT CHANGE UP (ref 80–100)
PHOSPHATE SERPL-MCNC: 3.6 MG/DL — SIGNIFICANT CHANGE UP (ref 2.5–4.5)
PLATELET # BLD AUTO: 215 K/UL — SIGNIFICANT CHANGE UP (ref 150–400)
POTASSIUM SERPL-MCNC: 5 MMOL/L — SIGNIFICANT CHANGE UP (ref 3.5–5.3)
POTASSIUM SERPL-SCNC: 5 MMOL/L — SIGNIFICANT CHANGE UP (ref 3.5–5.3)
RBC # BLD: 4.35 M/UL — SIGNIFICANT CHANGE UP (ref 3.8–5.2)
RBC # FLD: 14.3 % — SIGNIFICANT CHANGE UP (ref 10.3–14.5)
SODIUM SERPL-SCNC: 142 MMOL/L — SIGNIFICANT CHANGE UP (ref 135–145)
WBC # BLD: 10.56 K/UL — HIGH (ref 3.8–10.5)
WBC # FLD AUTO: 10.56 K/UL — HIGH (ref 3.8–10.5)

## 2019-09-03 PROCEDURE — 99231 SBSQ HOSP IP/OBS SF/LOW 25: CPT

## 2019-09-03 RX ORDER — NYSTATIN CREAM 100000 [USP'U]/G
1 CREAM TOPICAL
Refills: 0 | Status: DISCONTINUED | OUTPATIENT
Start: 2019-09-03 | End: 2019-09-06

## 2019-09-03 RX ORDER — LIDOCAINE 4 G/100G
1 CREAM TOPICAL DAILY
Refills: 0 | Status: DISCONTINUED | OUTPATIENT
Start: 2019-09-03 | End: 2019-09-06

## 2019-09-03 RX ORDER — LIDOCAINE 4 G/100G
1 CREAM TOPICAL
Qty: 7 | Refills: 0
Start: 2019-09-03

## 2019-09-03 RX ORDER — NYSTATIN CREAM 100000 [USP'U]/G
100000 CREAM TOPICAL
Qty: 0 | Refills: 0 | DISCHARGE

## 2019-09-03 RX ORDER — MORPHINE SULFATE 50 MG/1
1 CAPSULE, EXTENDED RELEASE ORAL
Qty: 0 | Refills: 0 | DISCHARGE

## 2019-09-03 RX ORDER — ACETAMINOPHEN 500 MG
2 TABLET ORAL
Qty: 0 | Refills: 0 | DISCHARGE
Start: 2019-09-03

## 2019-09-03 RX ORDER — LORATADINE 10 MG/1
1 TABLET ORAL
Qty: 0 | Refills: 0 | DISCHARGE
Start: 2019-09-03

## 2019-09-03 RX ORDER — IBUPROFEN 200 MG
1 TABLET ORAL
Qty: 0 | Refills: 0 | DISCHARGE
Start: 2019-09-03

## 2019-09-03 RX ORDER — CALCIUM CARBONATE 500(1250)
1 TABLET ORAL
Qty: 0 | Refills: 0 | DISCHARGE
Start: 2019-09-03

## 2019-09-03 RX ORDER — ZOLPIDEM TARTRATE 10 MG/1
5 TABLET ORAL ONCE
Refills: 0 | Status: DISCONTINUED | OUTPATIENT
Start: 2019-09-03 | End: 2019-09-03

## 2019-09-03 RX ORDER — MORPHINE SULFATE 50 MG/1
30 CAPSULE, EXTENDED RELEASE ORAL THREE TIMES A DAY
Refills: 0 | Status: DISCONTINUED | OUTPATIENT
Start: 2019-09-03 | End: 2019-09-06

## 2019-09-03 RX ADMIN — Medication 1 TABLET(S): at 11:59

## 2019-09-03 RX ADMIN — Medication 400 MILLIGRAM(S): at 09:39

## 2019-09-03 RX ADMIN — NYSTATIN CREAM 1 APPLICATION(S): 100000 CREAM TOPICAL at 17:50

## 2019-09-03 RX ADMIN — LIDOCAINE 1 PATCH: 4 CREAM TOPICAL at 11:57

## 2019-09-03 RX ADMIN — Medication 10 MILLIGRAM(S): at 11:58

## 2019-09-03 RX ADMIN — Medication 400 MILLIGRAM(S): at 14:08

## 2019-09-03 RX ADMIN — LIDOCAINE 1 PATCH: 4 CREAM TOPICAL at 19:36

## 2019-09-03 RX ADMIN — ZOLPIDEM TARTRATE 5 MILLIGRAM(S): 10 TABLET ORAL at 00:22

## 2019-09-03 RX ADMIN — Medication 650 MILLIGRAM(S): at 06:10

## 2019-09-03 RX ADMIN — ATORVASTATIN CALCIUM 40 MILLIGRAM(S): 80 TABLET, FILM COATED ORAL at 23:43

## 2019-09-03 RX ADMIN — Medication 650 MILLIGRAM(S): at 11:59

## 2019-09-03 RX ADMIN — LIDOCAINE 1 PATCH: 4 CREAM TOPICAL at 23:43

## 2019-09-03 RX ADMIN — CARBIDOPA AND LEVODOPA 1 TABLET(S): 25; 100 TABLET ORAL at 23:42

## 2019-09-03 RX ADMIN — MORPHINE SULFATE 30 MILLIGRAM(S): 50 CAPSULE, EXTENDED RELEASE ORAL at 11:59

## 2019-09-03 RX ADMIN — Medication 650 MILLIGRAM(S): at 06:05

## 2019-09-03 RX ADMIN — MORPHINE SULFATE 30 MILLIGRAM(S): 50 CAPSULE, EXTENDED RELEASE ORAL at 11:56

## 2019-09-03 RX ADMIN — LIDOCAINE 1 PATCH: 4 CREAM TOPICAL at 02:19

## 2019-09-03 RX ADMIN — LORATADINE 10 MILLIGRAM(S): 10 TABLET ORAL at 06:05

## 2019-09-03 RX ADMIN — MORPHINE SULFATE 30 MILLIGRAM(S): 50 CAPSULE, EXTENDED RELEASE ORAL at 23:42

## 2019-09-03 RX ADMIN — Medication 650 MILLIGRAM(S): at 17:50

## 2019-09-03 RX ADMIN — Medication 100 MILLIGRAM(S): at 23:42

## 2019-09-03 RX ADMIN — DULOXETINE HYDROCHLORIDE 60 MILLIGRAM(S): 30 CAPSULE, DELAYED RELEASE ORAL at 11:58

## 2019-09-03 RX ADMIN — Medication 400 MILLIGRAM(S): at 08:20

## 2019-09-03 RX ADMIN — PANTOPRAZOLE SODIUM 40 MILLIGRAM(S): 20 TABLET, DELAYED RELEASE ORAL at 06:05

## 2019-09-03 RX ADMIN — Medication 650 MILLIGRAM(S): at 12:02

## 2019-09-03 RX ADMIN — MORPHINE SULFATE 15 MILLIGRAM(S): 50 CAPSULE, EXTENDED RELEASE ORAL at 06:08

## 2019-09-03 RX ADMIN — MORPHINE SULFATE 15 MILLIGRAM(S): 50 CAPSULE, EXTENDED RELEASE ORAL at 06:38

## 2019-09-03 RX ADMIN — CARBIDOPA AND LEVODOPA 1 TABLET(S): 25; 100 TABLET ORAL at 12:02

## 2019-09-03 RX ADMIN — CARBIDOPA AND LEVODOPA 1 TABLET(S): 25; 100 TABLET ORAL at 17:50

## 2019-09-03 RX ADMIN — MORPHINE SULFATE 30 MILLIGRAM(S): 50 CAPSULE, EXTENDED RELEASE ORAL at 14:05

## 2019-09-03 RX ADMIN — Medication 100 MILLIGRAM(S): at 06:05

## 2019-09-03 RX ADMIN — Medication 650 MILLIGRAM(S): at 23:43

## 2019-09-03 RX ADMIN — Medication 100 MILLIGRAM(S): at 11:58

## 2019-09-03 RX ADMIN — CARBIDOPA AND LEVODOPA 1 TABLET(S): 25; 100 TABLET ORAL at 06:05

## 2019-09-03 RX ADMIN — MORPHINE SULFATE 30 MILLIGRAM(S): 50 CAPSULE, EXTENDED RELEASE ORAL at 14:35

## 2019-09-03 RX ADMIN — Medication 400 MILLIGRAM(S): at 23:42

## 2019-09-03 NOTE — DISCHARGE NOTE PROVIDER - CARE PROVIDER_API CALL
Bj Casas (MD)  Surgery; Surgical Critical Care  [address] [facility], [address] [address]  Phone: [phone]  Fax: [phone]  Follow Up Time: 1 week

## 2019-09-03 NOTE — PROGRESS NOTE ADULT - ASSESSMENT
74F sp fall w displaced fx of 3rd rib L displaced lateral 5th rib nondisplaced fx L 6th 7th rib fx    Plan:  - PT  - Pain control IR morphine TID  - CXR lungs clear displaced L 3rd rib fx sp R shoulder arthroplasty  - Nystatin  - Dc planning

## 2019-09-03 NOTE — DISCHARGE NOTE PROVIDER - NSDCCPCAREPLAN_GEN_ALL_CORE_FT
PRINCIPAL DISCHARGE DIAGNOSIS  Diagnosis: Rib fracture  Assessment and Plan of Treatment: Follow up with Dr. Casas in 1-2 weeks. Please call to schedule an appointment.   NOTIFY YOUR SURGEON IF: You have any any fever (over 100.4 F) or chills, persistent nausea/vomiting, persistent diarrhea, or if your pain is not controlled on your discharge pain medications. PRINCIPAL DISCHARGE DIAGNOSIS  Diagnosis: Rib fracture  Assessment and Plan of Treatment: Pain Control and Healing

## 2019-09-03 NOTE — DISCHARGE NOTE PROVIDER - NSDCACTIVITY_GEN_ALL_CORE
Showering allowed/Do not drive or operate machinery/Walking - Outdoors allowed/Bathing allowed/Do not make important decisions/Stairs allowed/Walking - Indoors allowed/No heavy lifting/straining No heavy lifting/straining

## 2019-09-03 NOTE — PROGRESS NOTE ADULT - ATTENDING COMMENTS
Pt seen and examined on 9/3, agree with above. Pt notes that her acute pain from her 5-7 rib fractures is not adequately treated with current multimodal pain regimen. Patient is on morphine ER and IR at home for chronic back pain, so current doses were inadequate. IR dose increased after discussion with patient. PT evaluation.

## 2019-09-03 NOTE — DISCHARGE NOTE PROVIDER - HOSPITAL COURSE
74F w/ PMH of HTN, HLD, arthritis, and Parkinson's presents s/p unwitnessed fall yesterday at around 2pm. Pt was in [facility] for a family trip, was in a kitchen turning her rolling walker around to change direction, and tripped and fell in the process, landing on her left chest.  was next door and came over right away to find her on the floor. Pt went to urgent care, CXR showed displaced fracture of 3rd rib. Pt and her  drove down to [facility] since she lives in [place].        In the ED, pt's vitals were wnl and stable, physical exam significant for focal tenderness on Left lateral chest wall, and pulling >1,500 on incentive spirometer w/ mild inspiratory pain. Labs were not significant for any values, and imaging of CXR repeated, again demonstrating displaced 3rd rib fracture on left chest. CT chest was obtained showing displaced lateral 5th rib fx, and nondisplaced left 6th and 7th rib fx     < from: CT Chest No Cont (09.01.19 @ 04:34) >        Acute mildly displaced lateral left fifth rib fracture deformity. Acute     displaced and overriding lateral left sixth and seventh rib fractures.         < from: CT Abdomen and Pelvis No Cont (07.24.19 @ 17:48) >        Moderate stool throughout the colon. No evidence for obstruction or gross     mass. Of note, there is limited evaluation of the bowel on noncontrast CT     scan. Please correlate clinically.        Renal cysts measuring up to 7 cm and lower pole of the left kidney. Left     inguinal hernia containing fat and small umbilical hernia containing fat.        Old displaced fracture of the left iliac bone and atrophy of the right     iliopsoas and left gluteus muscles. Correlate with patient's history.     Mild spondylolisthesis with L4 anterior to L5 and narrowing of the spinal     canal at this level. Additional findings as above.    < from: Xray Ribs 2 Views, Left (09.01.19 @ 02:24) >        mildly displaced left third posterior rib fracture.        Patient was admitted to the trauma service, multimodal pain control started, encouraged early ambulation and use of incentive spirometry. Physical therapy evaluated patient and recommended home with rolling walker. P        Pt hemodynamically stable. She is tolerating a regular diet, ambulating, voiding and pain controlled. Pt instructed to follow up with Dr. Casas in 1-2 weeks. Follow up with PCP Dr. Bruce @ [phone] regarding hospital course and incidental finding of L renal cyst. 74F w/ PMH of HTN, HLD, arthritis, and Parkinson's presents s/p unwitnessed fall yesterday at around 2pm. [place], was in the kitchen turning her rolling walker around to change direction and tripped and fell in the process, landing on her left chest.  was next door and came over right away to find her on the floor. Pt went to urgent care, CXR showed displaced fracture of 3rd rib. Pt and her  drove down to [facility] since she lives in [place].        In the ED, pt's vitals were wnl and stable, physical exam significant for focal tenderness on Left lateral chest wall, and pulling >1,500 on incentive spirometer w/ mild inspiratory pain. Labs were not significant for any values, and imaging of CXR repeated, again demonstrating displaced 3rd rib fracture on left chest. CT chest was obtained showing displaced lateral 5th rib fx and nondisplaced left 6th and 7th rib fx.         < from: CT Chest No Cont (09.01.19 @ 04:34) > Acute mildly displaced lateral left fifth rib fracture deformity. Acute displaced and overriding lateral left sixth and seventh rib fractures.         < from: CT Abdomen and Pelvis No Cont (07.24.19 @ 17:48) > Moderate stool throughout the colon. No evidence for obstruction or gross mass. Of note, there is limited evaluation of the bowel on noncontrast CT scan. Please correlate clinically.    Renal cysts measuring up to 7 cm and lower pole of the left kidney. Left inguinal hernia containing fat and small umbilical hernia containing fat.    Old displaced fracture of the left iliac bone and atrophy of the right iliopsoas and left gluteus muscles. Correlate with patient's history. Mild spondylolisthesis with L4 anterior to L5 and narrowing of the spinal canal at this level.         < from: Xray Ribs 2 Views, Left (09.01.19 @ 02:24) >mildly displaced left third posterior rib fracture.        Patient was admitted to the trauma service, multimodal pain control started, encouraged early ambulation and use of incentive spirometry. Follow up CXR showing no interval development of hemo/pneumothorax. Physical therapy recommended DIANA.  At the time of discharge, Pt was hemodynamically stable, tolerating a regular diet, ambulating, voiding and pain controlled.  Pt was instructed to follow up with Dr. Casas in 1-2 weeks and to Follow up with her PCP Dr. Bruce regarding hospital course and incidental finding of L renal cyst. 74F w/ PMH of HTN, HLD, arthritis, and Parkinson's presents s/p unwitnessed fall yesterday at around 2pm. [place], was in the kitchen turning her rolling walker around to change direction and tripped and fell in the process, landing on her left chest.  was next door and came over right away to find her on the floor. Pt went to urgent care, CXR showed displaced fracture of 3rd rib. Pt and her  drove down to [facility] since she lives in [place].        In the ED, pt's vitals were wnl and stable, physical exam significant for focal tenderness on Left lateral chest wall, and pulling >1,500 on incentive spirometer w/ mild inspiratory pain. Labs were not significant for any values, and imaging of CXR repeated, again demonstrating displaced 3rd rib fracture on left chest. CT chest was obtained showing displaced lateral 5th rib fx and nondisplaced left 6th and 7th rib fx.         < from: CT Chest No Cont (09.01.19 @ 04:34) > Acute mildly displaced lateral left fifth rib fracture deformity. Acute displaced and overriding lateral left sixth and seventh rib fractures.         < from: CT Abdomen and Pelvis No Cont (07.24.19 @ 17:48) > Moderate stool throughout the colon. No evidence for obstruction or gross mass. Of note, there is limited evaluation of the bowel on noncontrast CT scan. Please correlate clinically.    Renal cysts measuring up to 7 cm and lower pole of the left kidney. Left inguinal hernia containing fat and small umbilical hernia containing fat.    Old displaced fracture of the left iliac bone and atrophy of the right iliopsoas and left gluteus muscles. Correlate with patient's history. Mild spondylolisthesis with L4 anterior to L5 and narrowing of the spinal canal at this level.         < from: Xray Ribs 2 Views, Left (09.01.19 @ 02:24) >mildly displaced left third posterior rib fracture.        Patient was admitted to the trauma service, multimodal pain control started, encouraged early ambulation and use of incentive spirometry. Follow up CXR showing no interval development of hemo/pneumothorax. Physical therapy recommended DIANA.  At the time of discharge, Pt was hemodynamically stable, tolerating a regular diet, ambulating, voiding and pain controlled.  Pt was instructed to follow up with Dr. Casas in 1-2 weeks and to Follow up with her PCP Dr. Bruce regarding hospital course and incidental finding of L renal cyst.         The patient was instructed and counselled on the use of the Incentive spirometry to assist in lung expansion indicated for the compressive and dependent atelectasis seen in the CT chest. Follow-up CXR showed clear lungs and she was stable for discharge.

## 2019-09-03 NOTE — DISCHARGE NOTE PROVIDER - NSDCFUADDAPPT_GEN_ALL_CORE_FT
Follow up with your primary care Dr. Bruce. Please call [phone] to schedule an appointment. Discuss further workup for incidental finding of left renal cyst. Please follow up with your primary care Dr. Bruce.  Call [phone] to schedule an appointment. Discuss further workup for incidental finding of left renal cyst.

## 2019-09-03 NOTE — PROGRESS NOTE ADULT - SUBJECTIVE AND OBJECTIVE BOX
TRAUMA SURGERY DAILY PROGRESS NOTE:    Interval:  No acute events overnight endorsed.    Subjective:  Patient seen and examined this am. Reports pain is better. +OOB, sitting. Counselled rehab  over phone    Vital Signs Last 24 Hrs  T(C): 36.9 (03 Sep 2019 14:20), Max: 37.1 (03 Sep 2019 00:03)  T(F): 98.5 (03 Sep 2019 14:20), Max: 98.8 (03 Sep 2019 00:03)  HR: 79 (03 Sep 2019 14:20) (71 - 79)  BP: 108/63 (03 Sep 2019 14:20) (93/54 - 132/74)  BP(mean): --  RR: 16 (03 Sep 2019 14:20) (16 - 18)  SpO2: 95% (03 Sep 2019 14:20) (93% - 95%)    Exam:  Gen: NAD, resting in bed, alert and responding appropriately  Resp: Airway patent, non-labored respirations  Abd: Soft, ND, NTTP x 4 quadrants, no rebound or guarding.   Ext: No edema, WWP  Neuro: AAOx3, no focal deficits    I&O's Detail    02 Sep 2019 07:01  -  03 Sep 2019 07:00  --------------------------------------------------------  IN:    Oral Fluid: 1580 mL  Total IN: 1580 mL    OUT:    Voided: 600 mL  Total OUT: 600 mL    Total NET: 980 mL      03 Sep 2019 07:01  -  03 Sep 2019 15:13  --------------------------------------------------------  IN:    Oral Fluid: 760 mL  Total IN: 760 mL    OUT:    Voided: 200 mL  Total OUT: 200 mL    Total NET: 560 mL          Daily     Daily     MEDICATIONS  (STANDING):  acetaminophen   Tablet .. 650 milliGRAM(s) Oral every 6 hours  aMILoride 10 milliGRAM(s) Oral daily  atorvastatin 40 milliGRAM(s) Oral at bedtime  calcium carbonate   1250 mG (OsCal) 1 Tablet(s) Oral daily  carbidopa/levodopa  25/100 1 Tablet(s) Oral four times a day  docusate sodium 100 milliGRAM(s) Oral three times a day  DULoxetine 60 milliGRAM(s) Oral daily  ibuprofen  Tablet. 400 milliGRAM(s) Oral every 6 hours  lidocaine   Patch 1 Patch Transdermal daily  loratadine 10 milliGRAM(s) Oral daily  morphine ER Tablet 30 milliGRAM(s) Oral <User Schedule>  multivitamin 1 Tablet(s) Oral daily  nystatin Powder 1 Application(s) Topical two times a day  pantoprazole    Tablet 40 milliGRAM(s) Oral before breakfast    MEDICATIONS  (PRN):  morphine  IR 30 milliGRAM(s) Oral three times a day PRN Moderate Pain (4 - 6)  zolpidem 5 milliGRAM(s) Oral at bedtime PRN Insomnia      LABS:                        12.2   10.56 )-----------( 215      ( 03 Sep 2019 09:57 )             39.5     09-03    142  |  104  |  25<H>  ----------------------------<  86  5.0   |  25  |  0.91    Ca    9.6      03 Sep 2019 07:11  Phos  3.6     09-03  Mg     2.1     09-03            AMANDA Shrestha, PGY-1  [facility] Surgery  [id number] with any questions

## 2019-09-04 LAB
HCT VFR BLD CALC: 37.3 % — SIGNIFICANT CHANGE UP (ref 34.5–45)
HGB BLD-MCNC: 12.4 G/DL — SIGNIFICANT CHANGE UP (ref 11.5–15.5)
MCHC RBC-ENTMCNC: 30.3 PG — SIGNIFICANT CHANGE UP (ref 27–34)
MCHC RBC-ENTMCNC: 33.3 GM/DL — SIGNIFICANT CHANGE UP (ref 32–36)
MCV RBC AUTO: 91 FL — SIGNIFICANT CHANGE UP (ref 80–100)
PLATELET # BLD AUTO: 205 K/UL — SIGNIFICANT CHANGE UP (ref 150–400)
RBC # BLD: 4.1 M/UL — SIGNIFICANT CHANGE UP (ref 3.8–5.2)
RBC # FLD: 12.9 % — SIGNIFICANT CHANGE UP (ref 10.3–14.5)
WBC # BLD: 7.8 K/UL — SIGNIFICANT CHANGE UP (ref 3.8–10.5)
WBC # FLD AUTO: 7.8 K/UL — SIGNIFICANT CHANGE UP (ref 3.8–10.5)

## 2019-09-04 PROCEDURE — 99231 SBSQ HOSP IP/OBS SF/LOW 25: CPT

## 2019-09-04 RX ORDER — ZOLPIDEM TARTRATE 10 MG/1
10 TABLET ORAL AT BEDTIME
Refills: 0 | Status: DISCONTINUED | OUTPATIENT
Start: 2019-09-04 | End: 2019-09-06

## 2019-09-04 RX ORDER — ZOLPIDEM TARTRATE 10 MG/1
5 TABLET ORAL ONCE
Refills: 0 | Status: DISCONTINUED | OUTPATIENT
Start: 2019-09-04 | End: 2019-09-04

## 2019-09-04 RX ORDER — SENNA PLUS 8.6 MG/1
2 TABLET ORAL AT BEDTIME
Refills: 0 | Status: DISCONTINUED | OUTPATIENT
Start: 2019-09-04 | End: 2019-09-06

## 2019-09-04 RX ADMIN — MORPHINE SULFATE 30 MILLIGRAM(S): 50 CAPSULE, EXTENDED RELEASE ORAL at 09:25

## 2019-09-04 RX ADMIN — PANTOPRAZOLE SODIUM 40 MILLIGRAM(S): 20 TABLET, DELAYED RELEASE ORAL at 05:42

## 2019-09-04 RX ADMIN — Medication 400 MILLIGRAM(S): at 21:30

## 2019-09-04 RX ADMIN — CARBIDOPA AND LEVODOPA 1 TABLET(S): 25; 100 TABLET ORAL at 17:25

## 2019-09-04 RX ADMIN — Medication 650 MILLIGRAM(S): at 23:42

## 2019-09-04 RX ADMIN — Medication 650 MILLIGRAM(S): at 00:20

## 2019-09-04 RX ADMIN — Medication 400 MILLIGRAM(S): at 20:55

## 2019-09-04 RX ADMIN — Medication 650 MILLIGRAM(S): at 05:43

## 2019-09-04 RX ADMIN — Medication 1 TABLET(S): at 13:58

## 2019-09-04 RX ADMIN — Medication 650 MILLIGRAM(S): at 06:15

## 2019-09-04 RX ADMIN — CARBIDOPA AND LEVODOPA 1 TABLET(S): 25; 100 TABLET ORAL at 05:42

## 2019-09-04 RX ADMIN — LIDOCAINE 1 PATCH: 4 CREAM TOPICAL at 14:00

## 2019-09-04 RX ADMIN — CARBIDOPA AND LEVODOPA 1 TABLET(S): 25; 100 TABLET ORAL at 23:42

## 2019-09-04 RX ADMIN — MORPHINE SULFATE 30 MILLIGRAM(S): 50 CAPSULE, EXTENDED RELEASE ORAL at 20:55

## 2019-09-04 RX ADMIN — NYSTATIN CREAM 1 APPLICATION(S): 100000 CREAM TOPICAL at 17:25

## 2019-09-04 RX ADMIN — Medication 400 MILLIGRAM(S): at 16:58

## 2019-09-04 RX ADMIN — NYSTATIN CREAM 1 APPLICATION(S): 100000 CREAM TOPICAL at 05:42

## 2019-09-04 RX ADMIN — Medication 1 TABLET(S): at 13:57

## 2019-09-04 RX ADMIN — Medication 100 MILLIGRAM(S): at 13:59

## 2019-09-04 RX ADMIN — Medication 100 MILLIGRAM(S): at 05:42

## 2019-09-04 RX ADMIN — Medication 100 MILLIGRAM(S): at 21:02

## 2019-09-04 RX ADMIN — ZOLPIDEM TARTRATE 5 MILLIGRAM(S): 10 TABLET ORAL at 01:31

## 2019-09-04 RX ADMIN — MORPHINE SULFATE 30 MILLIGRAM(S): 50 CAPSULE, EXTENDED RELEASE ORAL at 13:58

## 2019-09-04 RX ADMIN — Medication 400 MILLIGRAM(S): at 15:00

## 2019-09-04 RX ADMIN — MORPHINE SULFATE 30 MILLIGRAM(S): 50 CAPSULE, EXTENDED RELEASE ORAL at 09:50

## 2019-09-04 RX ADMIN — Medication 400 MILLIGRAM(S): at 00:20

## 2019-09-04 RX ADMIN — ATORVASTATIN CALCIUM 40 MILLIGRAM(S): 80 TABLET, FILM COATED ORAL at 21:02

## 2019-09-04 RX ADMIN — Medication 650 MILLIGRAM(S): at 14:00

## 2019-09-04 RX ADMIN — Medication 10 MILLIGRAM(S): at 14:00

## 2019-09-04 RX ADMIN — LORATADINE 10 MILLIGRAM(S): 10 TABLET ORAL at 05:42

## 2019-09-04 RX ADMIN — DULOXETINE HYDROCHLORIDE 60 MILLIGRAM(S): 30 CAPSULE, DELAYED RELEASE ORAL at 13:59

## 2019-09-04 RX ADMIN — LIDOCAINE 1 PATCH: 4 CREAM TOPICAL at 18:44

## 2019-09-04 RX ADMIN — MORPHINE SULFATE 30 MILLIGRAM(S): 50 CAPSULE, EXTENDED RELEASE ORAL at 17:26

## 2019-09-04 RX ADMIN — ZOLPIDEM TARTRATE 5 MILLIGRAM(S): 10 TABLET ORAL at 00:43

## 2019-09-04 RX ADMIN — MORPHINE SULFATE 30 MILLIGRAM(S): 50 CAPSULE, EXTENDED RELEASE ORAL at 00:20

## 2019-09-04 RX ADMIN — CARBIDOPA AND LEVODOPA 1 TABLET(S): 25; 100 TABLET ORAL at 13:59

## 2019-09-04 RX ADMIN — Medication 650 MILLIGRAM(S): at 17:25

## 2019-09-04 RX ADMIN — ZOLPIDEM TARTRATE 10 MILLIGRAM(S): 10 TABLET ORAL at 23:42

## 2019-09-04 RX ADMIN — MORPHINE SULFATE 30 MILLIGRAM(S): 50 CAPSULE, EXTENDED RELEASE ORAL at 21:30

## 2019-09-04 RX ADMIN — Medication 650 MILLIGRAM(S): at 16:59

## 2019-09-04 NOTE — PROGRESS NOTE ADULT - SUBJECTIVE AND OBJECTIVE BOX
TRAUMA SURGERY DAILY PROGRESS NOTE:    Interval:  No acute events overnight endorsed.    Subjective:  Patient seen and examined this am. Pulling 1000 on IS. Counselled IS rehab home    Vital Signs Last 24 Hrs  T(C): 36.5 (04 Sep 2019 13:00), Max: 37.1 (03 Sep 2019 17:00)  T(F): 97.7 (04 Sep 2019 13:00), Max: 98.7 (03 Sep 2019 17:00)  HR: 75 (04 Sep 2019 13:00) (67 - 81)  BP: 105/72 (04 Sep 2019 13:00) (97/56 - 125/64)  BP(mean): --  RR: 16 (04 Sep 2019 13:00) (16 - 16)  SpO2: 95% (04 Sep 2019 13:00) (93% - 96%)    Exam:  Gen: NAD, resting in bed, alert and responding appropriately  Resp: Airway patent, non-labored respirations  Abd: Soft, ND, NTTP x 4 quadrants, no rebound or guarding.  Ext: No edema, WWP  Neuro: AAOx3, no focal deficits    I&O's Detail    03 Sep 2019 07:01  -  04 Sep 2019 07:00  --------------------------------------------------------  IN:    Oral Fluid: 1120 mL  Total IN: 1120 mL    OUT:    Voided: 900 mL  Total OUT: 900 mL    Total NET: 220 mL      04 Sep 2019 07:01  -  04 Sep 2019 13:25  --------------------------------------------------------  IN:    Oral Fluid: 360 mL  Total IN: 360 mL    OUT:  Total OUT: 0 mL    Total NET: 360 mL          Daily     Daily     MEDICATIONS  (STANDING):  acetaminophen   Tablet .. 650 milliGRAM(s) Oral every 6 hours  aMILoride 10 milliGRAM(s) Oral daily  atorvastatin 40 milliGRAM(s) Oral at bedtime  calcium carbonate   1250 mG (OsCal) 1 Tablet(s) Oral daily  carbidopa/levodopa  25/100 1 Tablet(s) Oral four times a day  docusate sodium 100 milliGRAM(s) Oral three times a day  DULoxetine 60 milliGRAM(s) Oral daily  ibuprofen  Tablet. 400 milliGRAM(s) Oral every 6 hours  lidocaine   Patch 1 Patch Transdermal daily  loratadine 10 milliGRAM(s) Oral daily  morphine ER Tablet 30 milliGRAM(s) Oral <User Schedule>  multivitamin 1 Tablet(s) Oral daily  nystatin Powder 1 Application(s) Topical two times a day  pantoprazole    Tablet 40 milliGRAM(s) Oral before breakfast  senna 2 Tablet(s) Oral at bedtime    MEDICATIONS  (PRN):  morphine  IR 30 milliGRAM(s) Oral three times a day PRN Moderate Pain (4 - 6)  zolpidem 10 milliGRAM(s) Oral at bedtime PRN Insomnia      LABS:                        12.4   7.8   )-----------( 205      ( 04 Sep 2019 06:55 )             37.3     09-03    142  |  104  |  25<H>  ----------------------------<  86  5.0   |  25  |  0.91    Ca    9.6      03 Sep 2019 07:11  Phos  3.6     09-03  Mg     2.1     09-03            AMANDA Shrestha, PGY-1  [facility] Team Surgery  [id number] with any questions

## 2019-09-04 NOTE — PROGRESS NOTE ADULT - ATTENDING COMMENTS
Pt seen and examined on 9/4, agree with above. Pt's pain control is better. I spoke with patient's  on the phone per patient request, and we discussed pain control regimen and rehab planning. Patient does not want to go to rehab, but her  feels she would benefit from rehab. Will continue to discuss. PT.

## 2019-09-05 PROCEDURE — 99231 SBSQ HOSP IP/OBS SF/LOW 25: CPT

## 2019-09-05 RX ORDER — MORPHINE SULFATE 50 MG/1
1 CAPSULE, EXTENDED RELEASE ORAL
Qty: 0 | Refills: 0 | DISCHARGE
Start: 2019-09-05

## 2019-09-05 RX ORDER — MORPHINE SULFATE 50 MG/1
1 CAPSULE, EXTENDED RELEASE ORAL
Qty: 0 | Refills: 0 | DISCHARGE

## 2019-09-05 RX ORDER — LIDOCAINE 4 G/100G
1 CREAM TOPICAL
Qty: 0 | Refills: 0 | DISCHARGE
Start: 2019-09-05

## 2019-09-05 RX ORDER — SENNA PLUS 8.6 MG/1
2 TABLET ORAL
Qty: 0 | Refills: 0 | DISCHARGE
Start: 2019-09-05

## 2019-09-05 RX ADMIN — MORPHINE SULFATE 30 MILLIGRAM(S): 50 CAPSULE, EXTENDED RELEASE ORAL at 21:00

## 2019-09-05 RX ADMIN — Medication 650 MILLIGRAM(S): at 17:35

## 2019-09-05 RX ADMIN — Medication 400 MILLIGRAM(S): at 15:37

## 2019-09-05 RX ADMIN — LIDOCAINE 1 PATCH: 4 CREAM TOPICAL at 04:29

## 2019-09-05 RX ADMIN — Medication 100 MILLIGRAM(S): at 12:44

## 2019-09-05 RX ADMIN — ZOLPIDEM TARTRATE 10 MILLIGRAM(S): 10 TABLET ORAL at 23:39

## 2019-09-05 RX ADMIN — Medication 650 MILLIGRAM(S): at 05:20

## 2019-09-05 RX ADMIN — Medication 1 TABLET(S): at 12:44

## 2019-09-05 RX ADMIN — Medication 100 MILLIGRAM(S): at 05:20

## 2019-09-05 RX ADMIN — Medication 100 MILLIGRAM(S): at 21:28

## 2019-09-05 RX ADMIN — MORPHINE SULFATE 30 MILLIGRAM(S): 50 CAPSULE, EXTENDED RELEASE ORAL at 13:51

## 2019-09-05 RX ADMIN — LIDOCAINE 1 PATCH: 4 CREAM TOPICAL at 18:53

## 2019-09-05 RX ADMIN — ATORVASTATIN CALCIUM 40 MILLIGRAM(S): 80 TABLET, FILM COATED ORAL at 21:28

## 2019-09-05 RX ADMIN — MORPHINE SULFATE 30 MILLIGRAM(S): 50 CAPSULE, EXTENDED RELEASE ORAL at 20:06

## 2019-09-05 RX ADMIN — LORATADINE 10 MILLIGRAM(S): 10 TABLET ORAL at 05:20

## 2019-09-05 RX ADMIN — NYSTATIN CREAM 1 APPLICATION(S): 100000 CREAM TOPICAL at 05:20

## 2019-09-05 RX ADMIN — Medication 650 MILLIGRAM(S): at 13:44

## 2019-09-05 RX ADMIN — PANTOPRAZOLE SODIUM 40 MILLIGRAM(S): 20 TABLET, DELAYED RELEASE ORAL at 05:20

## 2019-09-05 RX ADMIN — Medication 650 MILLIGRAM(S): at 05:50

## 2019-09-05 RX ADMIN — Medication 400 MILLIGRAM(S): at 14:37

## 2019-09-05 RX ADMIN — CARBIDOPA AND LEVODOPA 1 TABLET(S): 25; 100 TABLET ORAL at 05:20

## 2019-09-05 RX ADMIN — Medication 650 MILLIGRAM(S): at 23:40

## 2019-09-05 RX ADMIN — Medication 10 MILLIGRAM(S): at 12:45

## 2019-09-05 RX ADMIN — CARBIDOPA AND LEVODOPA 1 TABLET(S): 25; 100 TABLET ORAL at 17:35

## 2019-09-05 RX ADMIN — LIDOCAINE 1 PATCH: 4 CREAM TOPICAL at 12:45

## 2019-09-05 RX ADMIN — MORPHINE SULFATE 30 MILLIGRAM(S): 50 CAPSULE, EXTENDED RELEASE ORAL at 12:44

## 2019-09-05 RX ADMIN — Medication 400 MILLIGRAM(S): at 09:27

## 2019-09-05 RX ADMIN — CARBIDOPA AND LEVODOPA 1 TABLET(S): 25; 100 TABLET ORAL at 12:44

## 2019-09-05 RX ADMIN — Medication 400 MILLIGRAM(S): at 22:00

## 2019-09-05 RX ADMIN — Medication 400 MILLIGRAM(S): at 21:28

## 2019-09-05 RX ADMIN — Medication 650 MILLIGRAM(S): at 12:44

## 2019-09-05 RX ADMIN — MORPHINE SULFATE 30 MILLIGRAM(S): 50 CAPSULE, EXTENDED RELEASE ORAL at 13:44

## 2019-09-05 RX ADMIN — Medication 650 MILLIGRAM(S): at 00:18

## 2019-09-05 RX ADMIN — Medication 650 MILLIGRAM(S): at 17:56

## 2019-09-05 RX ADMIN — Medication 400 MILLIGRAM(S): at 10:27

## 2019-09-05 RX ADMIN — Medication 1 TABLET(S): at 12:45

## 2019-09-05 RX ADMIN — DULOXETINE HYDROCHLORIDE 60 MILLIGRAM(S): 30 CAPSULE, DELAYED RELEASE ORAL at 12:45

## 2019-09-05 RX ADMIN — CARBIDOPA AND LEVODOPA 1 TABLET(S): 25; 100 TABLET ORAL at 23:40

## 2019-09-05 RX ADMIN — NYSTATIN CREAM 1 APPLICATION(S): 100000 CREAM TOPICAL at 17:36

## 2019-09-05 RX ADMIN — MORPHINE SULFATE 30 MILLIGRAM(S): 50 CAPSULE, EXTENDED RELEASE ORAL at 12:51

## 2019-09-05 NOTE — PROGRESS NOTE ADULT - SUBJECTIVE AND OBJECTIVE BOX
TRAUMA SURGERY DAILY PROGRESS NOTE:       Interval: Choices sent for DINAA.     SUBJECTIVE:     Patient feels well. Pain well controlled. .      OBJECTIVE:    MEDICATIONS  (STANDING):  acetaminophen   Tablet .. 650 milliGRAM(s) Oral every 6 hours  aMILoride 10 milliGRAM(s) Oral daily  atorvastatin 40 milliGRAM(s) Oral at bedtime  calcium carbonate   1250 mG (OsCal) 1 Tablet(s) Oral daily  carbidopa/levodopa  25/100 1 Tablet(s) Oral four times a day  docusate sodium 100 milliGRAM(s) Oral three times a day  DULoxetine 60 milliGRAM(s) Oral daily  ibuprofen  Tablet. 400 milliGRAM(s) Oral every 6 hours  lidocaine   Patch 1 Patch Transdermal daily  loratadine 10 milliGRAM(s) Oral daily  morphine ER Tablet 30 milliGRAM(s) Oral <User Schedule>  multivitamin 1 Tablet(s) Oral daily  nystatin Powder 1 Application(s) Topical two times a day  pantoprazole    Tablet 40 milliGRAM(s) Oral before breakfast  senna 2 Tablet(s) Oral at bedtime    MEDICATIONS  (PRN):  morphine  IR 30 milliGRAM(s) Oral three times a day PRN Moderate Pain (4 - 6)  zolpidem 10 milliGRAM(s) Oral at bedtime PRN Insomnia      Vital Signs Last 24 Hrs  T(C): 36.7 (05 Sep 2019 04:17), Max: 36.9 (04 Sep 2019 17:30)  T(F): 98.1 (05 Sep 2019 04:17), Max: 98.5 (04 Sep 2019 17:30)  HR: 72 (05 Sep 2019 04:17) (67 - 80)  BP: 118/68 (05 Sep 2019 04:17) (91/60 - 120/72)  BP(mean): --  RR: 16 (05 Sep 2019 04:17) (16 - 16)  SpO2: 97% (05 Sep 2019 04:17) (94% - 97%)      I&O's Detail    04 Sep 2019 07:01  -  05 Sep 2019 07:00  --------------------------------------------------------  IN:    Oral Fluid: 1200 mL  Total IN: 1200 mL    OUT:    Voided: 600 mL  Total OUT: 600 mL    Total NET: 600 mL        LABS:                        12.4   7.8   )-----------( 205      ( 04 Sep 2019 06:55 )             37.3             PHYSICAL EXAM:  Gen: NAD, resting in bed, alert and responding appropriately  Resp: Airway patent, non-labored respirations  Abd: Soft, ND, NTTP x 4 quadrants, no rebound or guarding.  Ext: No edema, WWP  MSKL: Left chest wall mildly tender to palpation   Neuro: AAOx3, no focal deficits

## 2019-09-05 NOTE — PROGRESS NOTE ADULT - ATTENDING COMMENTS
Pt seen and examined, agree with above. Pt feeling better, agreed to rehab. Continue pain control for 5-7 rib fractures in setting of chronic back pain.

## 2019-09-05 NOTE — PROGRESS NOTE ADULT - ASSESSMENT
74F sp fall w displaced fx of 3rd rib L displaced lateral 5th rib nondisplaced fx L 6th 7th rib fx    Plan:  - Pain control IR morphine TID  - OOB/ISS   - Dc planning to [facility]    ACS Surgery   [phone]

## 2019-09-06 ENCOUNTER — TRANSCRIPTION ENCOUNTER (OUTPATIENT)
Age: 74
End: 2019-09-06

## 2019-09-06 VITALS
RESPIRATION RATE: 16 BRPM | TEMPERATURE: 98 F | HEART RATE: 84 BPM | SYSTOLIC BLOOD PRESSURE: 102 MMHG | OXYGEN SATURATION: 95 % | DIASTOLIC BLOOD PRESSURE: 62 MMHG

## 2019-09-06 PROCEDURE — 99238 HOSP IP/OBS DSCHRG MGMT 30/<: CPT

## 2019-09-06 RX ADMIN — CARBIDOPA AND LEVODOPA 1 TABLET(S): 25; 100 TABLET ORAL at 11:39

## 2019-09-06 RX ADMIN — Medication 650 MILLIGRAM(S): at 11:42

## 2019-09-06 RX ADMIN — Medication 100 MILLIGRAM(S): at 14:30

## 2019-09-06 RX ADMIN — Medication 650 MILLIGRAM(S): at 06:16

## 2019-09-06 RX ADMIN — Medication 100 MILLIGRAM(S): at 06:16

## 2019-09-06 RX ADMIN — CARBIDOPA AND LEVODOPA 1 TABLET(S): 25; 100 TABLET ORAL at 06:16

## 2019-09-06 RX ADMIN — MORPHINE SULFATE 30 MILLIGRAM(S): 50 CAPSULE, EXTENDED RELEASE ORAL at 11:38

## 2019-09-06 RX ADMIN — PANTOPRAZOLE SODIUM 40 MILLIGRAM(S): 20 TABLET, DELAYED RELEASE ORAL at 06:16

## 2019-09-06 RX ADMIN — Medication 400 MILLIGRAM(S): at 14:30

## 2019-09-06 RX ADMIN — NYSTATIN CREAM 1 APPLICATION(S): 100000 CREAM TOPICAL at 06:16

## 2019-09-06 RX ADMIN — LIDOCAINE 1 PATCH: 4 CREAM TOPICAL at 00:38

## 2019-09-06 RX ADMIN — Medication 650 MILLIGRAM(S): at 12:08

## 2019-09-06 RX ADMIN — Medication 1 TABLET(S): at 14:30

## 2019-09-06 RX ADMIN — LORATADINE 10 MILLIGRAM(S): 10 TABLET ORAL at 06:16

## 2019-09-06 RX ADMIN — Medication 1 TABLET(S): at 11:39

## 2019-09-06 RX ADMIN — DULOXETINE HYDROCHLORIDE 60 MILLIGRAM(S): 30 CAPSULE, DELAYED RELEASE ORAL at 14:30

## 2019-09-06 RX ADMIN — MORPHINE SULFATE 30 MILLIGRAM(S): 50 CAPSULE, EXTENDED RELEASE ORAL at 12:08

## 2019-09-06 RX ADMIN — LIDOCAINE 1 PATCH: 4 CREAM TOPICAL at 11:38

## 2019-09-06 RX ADMIN — MORPHINE SULFATE 30 MILLIGRAM(S): 50 CAPSULE, EXTENDED RELEASE ORAL at 17:14

## 2019-09-06 NOTE — PROGRESS NOTE ADULT - SUBJECTIVE AND OBJECTIVE BOX
TRAUMA SURGERY DAILY PROGRESS NOTE:    Interval:  No acute events overnight endorsed.    Subjective:  Patient seen and examined this am. Reports pain is well controlled. No other complaints. Denies fever. Denies HA/CP/SOB. Denies N/V/D. +Tolerating diet. +Voiding. +Passing flatus. +BM. +OOB.    Vital Signs Last 24 Hrs  T(C): 36.9 (06 Sep 2019 00:14), Max: 37.1 (05 Sep 2019 10:46)  T(F): 98.4 (06 Sep 2019 00:14), Max: 98.8 (05 Sep 2019 10:46)  HR: 80 (06 Sep 2019 00:14) (68 - 80)  BP: 110/63 (06 Sep 2019 00:14) (93/54 - 145/84)  BP(mean): --  RR: 16 (06 Sep 2019 00:14) (16 - 16)  SpO2: 95% (06 Sep 2019 00:14) (95% - 97%)    Exam:  Gen: NAD, resting in bed, alert and responding appropriately  Resp: Airway patent, non-labored respirations  Abd: Soft, ND, NTTP x 4 quadrants, no rebound or guarding. Incisions c/d/i  Ext: No edema, WWP  Neuro: AAOx3, no focal deficits    I&O's Detail    04 Sep 2019 07:01  -  05 Sep 2019 07:00  --------------------------------------------------------  IN:    Oral Fluid: 1200 mL  Total IN: 1200 mL    OUT:    Voided: 600 mL  Total OUT: 600 mL    Total NET: 600 mL      05 Sep 2019 07:01  -  06 Sep 2019 03:00  --------------------------------------------------------  IN:    Oral Fluid: 1140 mL  Total IN: 1140 mL    OUT:    Voided: 870 mL  Total OUT: 870 mL    Total NET: 270 mL          Daily     Daily     MEDICATIONS  (STANDING):  acetaminophen   Tablet .. 650 milliGRAM(s) Oral every 6 hours  aMILoride 10 milliGRAM(s) Oral daily  atorvastatin 40 milliGRAM(s) Oral at bedtime  calcium carbonate   1250 mG (OsCal) 1 Tablet(s) Oral daily  carbidopa/levodopa  25/100 1 Tablet(s) Oral four times a day  docusate sodium 100 milliGRAM(s) Oral three times a day  DULoxetine 60 milliGRAM(s) Oral daily  ibuprofen  Tablet. 400 milliGRAM(s) Oral every 6 hours  lidocaine   Patch 1 Patch Transdermal daily  loratadine 10 milliGRAM(s) Oral daily  morphine ER Tablet 30 milliGRAM(s) Oral <User Schedule>  multivitamin 1 Tablet(s) Oral daily  nystatin Powder 1 Application(s) Topical two times a day  pantoprazole    Tablet 40 milliGRAM(s) Oral before breakfast  senna 2 Tablet(s) Oral at bedtime    MEDICATIONS  (PRN):  morphine  IR 30 milliGRAM(s) Oral three times a day PRN Moderate Pain (4 - 6)  zolpidem 10 milliGRAM(s) Oral at bedtime PRN Insomnia      LABS:                        12.4   7.8   )-----------( 205      ( 04 Sep 2019 06:55 )             37.3                 AMANDA Shrestha, PGY-1  [facility] Surgery  [id number] with any questions TRAUMA SURGERY DAILY PROGRESS NOTE:    Interval:  No acute events overnight endorsed.    Subjective:  Patient seen and examined this am. Reports pain is well controlled. No other complaints. Denies fever. Denies HA/CP/SOB. Denies N/V/D. +Tolerating diet. +Voiding. +Passing flatus. +BM. +OOB.    Vital Signs Last 24 Hrs  T(C): 36.9 (06 Sep 2019 00:14), Max: 37.1 (05 Sep 2019 10:46)  T(F): 98.4 (06 Sep 2019 00:14), Max: 98.8 (05 Sep 2019 10:46)  HR: 80 (06 Sep 2019 00:14) (68 - 80)  BP: 110/63 (06 Sep 2019 00:14) (93/54 - 145/84)  BP(mean): --  RR: 16 (06 Sep 2019 00:14) (16 - 16)  SpO2: 95% (06 Sep 2019 00:14) (95% - 97%)    Exam:  Gen: NAD, resting in bed, alert and responding appropriately  Resp: Airway patent, non-labored respirations  Abd: Soft, ND, NTTP x 4 quadrants, no rebound or guarding. Incisions c/d/i  Ext: No edema, WWP  Neuro: AAOx3, no focal deficits    I&O's Detail    04 Sep 2019 07:01  -  05 Sep 2019 07:00  --------------------------------------------------------  IN:    Oral Fluid: 1200 mL  Total IN: 1200 mL    OUT:    Voided: 600 mL  Total OUT: 600 mL    Total NET: 600 mL      05 Sep 2019 07:01  -  06 Sep 2019 03:00  --------------------------------------------------------  IN:    Oral Fluid: 1140 mL  Total IN: 1140 mL    OUT:    Voided: 870 mL  Total OUT: 870 mL    Total NET: 270 mL          Daily     Daily     MEDICATIONS  (STANDING):  acetaminophen   Tablet .. 650 milliGRAM(s) Oral every 6 hours  aMILoride 10 milliGRAM(s) Oral daily  atorvastatin 40 milliGRAM(s) Oral at bedtime  calcium carbonate   1250 mG (OsCal) 1 Tablet(s) Oral daily  carbidopa/levodopa  25/100 1 Tablet(s) Oral four times a day  docusate sodium 100 milliGRAM(s) Oral three times a day  DULoxetine 60 milliGRAM(s) Oral daily  ibuprofen  Tablet. 400 milliGRAM(s) Oral every 6 hours  lidocaine   Patch 1 Patch Transdermal daily  loratadine 10 milliGRAM(s) Oral daily  morphine ER Tablet 30 milliGRAM(s) Oral <User Schedule>  multivitamin 1 Tablet(s) Oral daily  nystatin Powder 1 Application(s) Topical two times a day  pantoprazole    Tablet 40 milliGRAM(s) Oral before breakfast  senna 2 Tablet(s) Oral at bedtime    MEDICATIONS  (PRN):  morphine  IR 30 milliGRAM(s) Oral three times a day PRN Moderate Pain (4 - 6)  zolpidem 10 milliGRAM(s) Oral at bedtime PRN Insomnia      LABS:                        12.4   7.8   )-----------( 205      ( 04 Sep 2019 06:55 )             37.3

## 2019-09-06 NOTE — DISCHARGE NOTE NURSING/CASE MANAGEMENT/SOCIAL WORK - PATIENT PORTAL LINK FT
You can access the FollowMyHealth Patient Portal offered by [facility] by registering at the following website: [email or web address]. By joining [employer]’s FollowMyHealth portal, you will also be able to view your health information using other applications (apps) compatible with our system.

## 2019-09-06 NOTE — PROGRESS NOTE ADULT - ASSESSMENT
74F sp fall w displaced fx of 3rd rib L displaced lateral 5th rib nondisplaced fx L 6th 7th rib fx    Plan:  - Pain control IR morphine TID  - OOB/ISS   - Dc planning to [facility]    ACS Surgery   [phone]

## 2019-09-06 NOTE — DISCHARGE NOTE NURSING/CASE MANAGEMENT/SOCIAL WORK - NSDCFUADDAPPT_GEN_ALL_CORE_FT
Please follow up with your primary care Dr. Bruce.  Call [phone] to schedule an appointment. Discuss further workup for incidental finding of left renal cyst.

## 2019-09-19 PROCEDURE — 85610 PROTHROMBIN TIME: CPT

## 2019-09-19 PROCEDURE — 97116 GAIT TRAINING THERAPY: CPT

## 2019-09-19 PROCEDURE — 84100 ASSAY OF PHOSPHORUS: CPT

## 2019-09-19 PROCEDURE — 85730 THROMBOPLASTIN TIME PARTIAL: CPT

## 2019-09-19 PROCEDURE — 71045 X-RAY EXAM CHEST 1 VIEW: CPT

## 2019-09-19 PROCEDURE — 97530 THERAPEUTIC ACTIVITIES: CPT

## 2019-09-19 PROCEDURE — 86803 HEPATITIS C AB TEST: CPT

## 2019-09-19 PROCEDURE — 80048 BASIC METABOLIC PNL TOTAL CA: CPT

## 2019-09-19 PROCEDURE — 83735 ASSAY OF MAGNESIUM: CPT

## 2019-09-19 PROCEDURE — 71100 X-RAY EXAM RIBS UNI 2 VIEWS: CPT

## 2019-09-19 PROCEDURE — 99285 EMERGENCY DEPT VISIT HI MDM: CPT | Mod: 25

## 2019-09-19 PROCEDURE — 96374 THER/PROPH/DIAG INJ IV PUSH: CPT

## 2019-09-19 PROCEDURE — 97162 PT EVAL MOD COMPLEX 30 MIN: CPT

## 2019-09-19 PROCEDURE — 80053 COMPREHEN METABOLIC PANEL: CPT

## 2019-09-19 PROCEDURE — 85027 COMPLETE CBC AUTOMATED: CPT

## 2019-09-19 PROCEDURE — 97110 THERAPEUTIC EXERCISES: CPT

## 2019-09-19 PROCEDURE — 71250 CT THORAX DX C-: CPT

## 2019-10-25 NOTE — ED ADULT NURSE NOTE - NSIMPLEMENTINTERV_GEN_ALL_ED
Peripheral IV  Date/Time: 10/25/2019 8:38 AM  Performed by: Saul Howe M.D.  Authorized by: Saul Howe M.D.     Size:  22 G  Laterality:  Left  Site Prep:  Alcohol  Technique:  Direct puncture  Attempts:  1         Implemented All Fall Risk Interventions:  Tye to call system. Call bell, personal items and telephone within reach. Instruct patient to call for assistance. Room bathroom lighting operational. Non-slip footwear when patient is off stretcher. Physically safe environment: no spills, clutter or unnecessary equipment. Stretcher in lowest position, wheels locked, appropriate side rails in place. Provide visual cue, wrist band, yellow gown, etc. Monitor gait and stability. Monitor for mental status changes and reorient to person, place, and time. Review medications for side effects contributing to fall risk. Reinforce activity limits and safety measures with patient and family.

## 2020-05-14 NOTE — ED ADULT NURSE NOTE - PRO INTERPRETER NEED 2
----- Message from Megan Pickard NP sent at 5/13/2020  1:18 PM CDT -----  She needs blood drawn next Wednesday. ESR and CRP. Have Cha call with results and check on patient to see how she is doing. She will need a CT Head wo in 1 year with follow-up appt for  shunt monitoring. Thanks!  
Labs scheduled and remind me put into Epic.  
English

## 2020-12-21 PROBLEM — N39.0 ACUTE UTI: Status: RESOLVED | Noted: 2019-08-05 | Resolved: 2020-12-21

## 2021-04-15 NOTE — PROGRESS NOTE ADULT - ASSESSMENT
74F sp fall w displaced fx of 3rd rib L displaced lateral 5th rib nondisplaced fx L 6th 7th rib fx    Plan:  - PT  - Pain control IR morphine TID  - CXR lungs clear displaced L 3rd rib fx sp R shoulder arthroplasty  - Nystatin  - Dc planning  - Ambien 10 Hydroxychloroquine Pregnancy And Lactation Text: This medication has been shown to cause fetal harm but it isn't assigned a Pregnancy Risk Category. There are small amounts excreted in breast milk.

## 2021-05-30 NOTE — ED ADULT NURSE NOTE - EXTENSIONS OF SELF_ADULT
Monthly follow up phone call for the COPD Program. No answer. Left message with call back number for any questions or phone calls   None

## 2021-10-29 NOTE — ED PROVIDER NOTE - RESPIRATORY, MLM
Addended by: RENE CHAHAL on: 10/29/2021 12:39 PM     Modules accepted: Orders     Breath sounds clear and equal bilaterally.

## 2022-02-22 NOTE — ED PROVIDER NOTE - OBJECTIVE STATEMENT
74 yoF, PMHx of parkinson's, chronic pain, DJD, spinal stenosis, severe arthritis, presents to ED with history of constipation for several weeks. Is on significant morphine chronically for pain. Taking colace as routinely. Has had issues with constipation in the past. Had bowel movement today which was adequate but stills feels like she has to go. Took picture of anus this am (appears to prolapsed rectum when reviewing image in ED by this physician) which felt different so came for eval. No NV. No significant abd pain. No bleed per rectum. No prior abd SHx. No fever. Tolerating PO.
<-- Click to add NO pertinent Family History

## 2022-03-24 NOTE — ED PROVIDER NOTE - CLINICAL SUMMARY MEDICAL DECISION MAKING FREE TEXT BOX
[place]averty PGY2- h/p d/w constipation, likely opoid induced (4) no limitation Haverty PGY2- h/p d/w constipation, likely opoid induced, hx of prolapse based off picture/history, constipation in the past, no abd surgical history, no fever, NVD, toleraring po, no urinary issues  abd soft/nt, rectal with brown stool, mild blood seen, no hemorrhoids or prolapse seen  enema likely d/c

## 2022-06-06 NOTE — ED PROVIDER NOTE - CARE PROVIDERS DIRECT ADDRESSES
Patient was a no call, no show for his [facility] wound care appointment today, message left on his voice mail to call us back to schedule. [email or web address]

## 2022-11-24 ENCOUNTER — NON-APPOINTMENT (OUTPATIENT)
Age: 77
End: 2022-11-24

## 2023-03-16 NOTE — H&P PST ADULT - ACTIVITY
minimal due to decreased mobility ADLs (with assistance, physical therapy, ambulates with walker short distances , minimal due to decreased mobility 2/2 Parkinson's' ADLs (with assistance), physical therapy, ambulates with walker short distances, mostly wheelchair bound, minimal due to decreased mobility 2/2 Parkinson's' Bcc  Morpheaform/Sclerosing Histology Text: There were numerous aggregates of basaloid cells demonstrating an infiltrative pattern in a background of sclerotic stroma

## 2023-05-09 NOTE — PROVIDER CONTACT NOTE (MEDICATION) - SITUATION
Pt would like to know if the doctor can give her an order for a routine mammogram. Please, call the patient when the order is entered in the system. pt s/p fall L rib fxs

## 2023-06-01 NOTE — ED PROVIDER NOTE - PSH
S/P Breast Biopsy    S/P BSO    S/P Bunionectomy    S/P knee replacement, left    S/P tonsillectomy and adenoidectomy    Shoulder pain  s/p Rt shoulder prosthetic surgery in past
Scheduled/Direct

## 2023-09-26 NOTE — DISCHARGE NOTE PROVIDER - PROVIDER TOKENS
Drink plenty of fluids-water preferably, eat a heart healthy diet, get plenty of sleep and do warm salt water gargles twice a day until feeling better.  
PROVIDER:[TOKEN:[[id number]],FOLLOWUP:[1 week]]

## 2023-11-09 NOTE — CHIEF COMPLAINT
Regarding: symptoms not improving/ worsening  ----- Message from [facility] sent at 11/9/2023 12:02 PM EST -----  Patient sent General Sentimentt message asking how long antibiotics take to work that she was prescribed on 11/7/23 office visit for flu like symptoms. She states that she feels worse today, but did not include which symptoms were worse in her message. [Questionnaire Received] : Patient questionnaire received [Falling Pelvic Organs] : falling pelvic organs

## 2024-04-04 NOTE — ED PROVIDER NOTE - PMH
[facility]  Progress Note    Sunita Loza Patient Status:  Inpatient    1932 MRN [id number]   Location [facility] 4SW-A Attending Renny Gilbert MD   Hosp Day # 1 PCP Carmen Tapia MD     Subjective:    Patient pleasantly confused this morning  She had pulled out her own NG tube   Off pressors  No output from stoma     Objective/Physical Exam:    Vital Signs:  Blood pressure (!) 150/129, pulse 93, temperature 99.3 °F (37.4 °C), temperature source Temporal, resp. rate 16, height 5' (1.524 m), weight 87 lb 8.4 oz (39.7 kg), SpO2 99%.    General:  Alert, orientated to self.  Cooperative.  No apparent distress.  Abdomen:  aquacel intact, stoma pink and viable, serous drainage noted, no stool, no gas output, soft, minimal tenderness, non distended     Labs:  Reviewed    Lab Results   Component Value Date    WBC 12.5 2024    HGB 7.7 2024    HCT 23.3 2024    .0 2024    CREATSERUM 4.13 2024    BUN 59 2024     2024    K 3.6 2024     2024    CO2 20.0 2024     2024    CA 7.1 2024    ALB 2.3 2024    ALKPHO 73 2024    BILT 0.1 2024    TP 5.1 2024    AST 20 2024    ALT 18 2024    INR 1.19 2024    PTP 15.1 2024     Problem List:  Patient Active Problem List   Diagnosis    DJD (degenerative joint disease), cervical    Asymptomatic postmenopausal status    Benign essential HTN    Chronic kidney disease, stage III (moderate) (HCC)    Chronic rhinitis    Disorder of bone and cartilage    Esophageal reflux    Generalized osteoarthrosis, involving multiple sites    Malignant neoplasm of uterus (HCC)    Other vitamin B12 deficiency anemia    Pure hypercholesterolemia    Renal failure    Acute pain of left knee    Vitamin D deficiency    Metabolic acidosis    Malignant neoplasm (HCC)    Hypokalemia    Hyperlipidemia, mixed    Endometrial carcinoma (HCC)    Anemia in  C. difficile diarrhea  2013  Chronic constipation    DDD (Degenerative Disc Disease)  chronic pain  GERD (gastroesophageal reflux disease)    History of Osteoarthritis    History of Rotator Cuff Tear  right  History of Spinal Stenosis    HTN (Hypertension)    Hx MRSA Infection  3 yrs ago in right second toe tx with abx  Hyperlipidemia    Insomnia    MVP (Mitral Valve Prolapse)  benign  Parkinsons  x 10 yrs  PWS (Port Wine Stain)    Rectal prolapse    Vitamin D deficiency chronic kidney disease    Osteopenia    Chronic kidney disease, stage 3b (HCC)    Otalgia    Sensorineural hearing loss, bilateral    ZOHREH (acute kidney injury) (HCC)    Hypernatremia    Hypomagnesemia    Dehydration    ATN (acute tubular necrosis) (HCC)    Atrial fibrillation (HCC)    Bilateral leg edema    Hypertension    Primary osteoarthritis of left knee    Abdominal mass, unspecified abdominal location    Anemia    Acute kidney injury (HCC)    Azotemia    Hyperglycemia    Abdominal pain, generalized    CKD (chronic kidney disease) stage 4, GFR 15-29 ml/min (HCC)    Shock (HCC)    Decreased ferritin       Impression:     89 y/o POD # 1 EXPLORATORY LAPAROTOMY, LYSIS OF ADHESIONS, PARTIAL COLON RESECTION WITH TAKEDOWN OF COLOSTOMY AND CREATION OF  COLOSTOMY, mobilization of the splenic flexure   -off pressors, stable with confusion   -CKD/ZOHREH renal following  -anemia hgb 7.7   -stoma pink, viable without gas/stool present    Plan:    Continue NPO ice chips ok  IV tylenol for pain, dilaudid likely contributes to confusion   Continue medical management   IV fluids per renal  Ok to transfer to SCU  PT/OT eval   All questions answered  DW CARLOTA Szymanski  [facility]  General Surgery  4/4/2024

## 2024-08-08 NOTE — ED ADULT NURSE NOTE - NS ED NURSE LEVEL OF CONSCIOUSNESS AFFECT
[Work-up necessary to assess local, regional or metastatic recurrence] : Work-up necessary to assess local, regional or metastatic recurrence
[Work-up necessary to assess local, regional or metastatic recurrence] : Work-up necessary to assess local, regional or metastatic recurrence
Calm/Appropriate

## 2024-11-13 NOTE — ED PROVIDER NOTE - RESPIRATORY NEGATIVE STATEMENT, MLM
Problem: Oxygenation/Respiratory Function  Goal: # Maintain/achieve baseline respiratory status (ex: appropriate respiratory rate, nonlabored work of breathing, symmetrical chest expansion, optimal breath sounds, effective gas exchange)  Outcome: Monitoring/Evaluating progress  Goal: # Maintain/achieve clear and patent airway  Outcome: Monitoring/Evaluating progress  Goal: # Optimized respiratory function  Outcome: Monitoring/Evaluating progress  Goal: Demonstrates no complication from assisted ventilation  Outcome: Monitoring/Evaluating progress  Goal: # Patient/parent/caregiver verbalizes understanding of patient's respiratory condition and treatment  Description: Document on Patient Education Activity  Outcome: Monitoring/Evaluating progress      no chest pain, no cough, and no shortness of breath.